# Patient Record
Sex: FEMALE | Race: WHITE | NOT HISPANIC OR LATINO | URBAN - METROPOLITAN AREA
[De-identification: names, ages, dates, MRNs, and addresses within clinical notes are randomized per-mention and may not be internally consistent; named-entity substitution may affect disease eponyms.]

---

## 2020-02-12 ENCOUNTER — OFFICE VISIT (OUTPATIENT)
Dept: OBGYN CLINIC | Facility: CLINIC | Age: 25
End: 2020-02-12
Payer: COMMERCIAL

## 2020-02-12 VITALS
BODY MASS INDEX: 29.86 KG/M2 | SYSTOLIC BLOOD PRESSURE: 122 MMHG | DIASTOLIC BLOOD PRESSURE: 78 MMHG | WEIGHT: 197 LBS | HEIGHT: 68 IN

## 2020-02-12 DIAGNOSIS — E58 DIETARY CALCIUM DEFICIENCY: ICD-10-CM

## 2020-02-12 DIAGNOSIS — Z20.2 POSSIBLE EXPOSURE TO STD: ICD-10-CM

## 2020-02-12 DIAGNOSIS — Z12.4 PAP SMEAR FOR CERVICAL CANCER SCREENING: ICD-10-CM

## 2020-02-12 DIAGNOSIS — Z01.419 ENCOUNTER FOR ANNUAL ROUTINE GYNECOLOGICAL EXAMINATION: Primary | ICD-10-CM

## 2020-02-12 DIAGNOSIS — Z30.41 ORAL CONTRACEPTIVE PILL SURVEILLANCE: ICD-10-CM

## 2020-02-12 DIAGNOSIS — N94.6 DYSMENORRHEA: ICD-10-CM

## 2020-02-12 DIAGNOSIS — Z72.3 INADEQUATE EXERCISE: ICD-10-CM

## 2020-02-12 DIAGNOSIS — Z23 NEED FOR HPV VACCINATION: ICD-10-CM

## 2020-02-12 PROCEDURE — 90471 IMMUNIZATION ADMIN: CPT | Performed by: OBSTETRICS & GYNECOLOGY

## 2020-02-12 PROCEDURE — G0145 SCR C/V CYTO,THINLAYER,RESCR: HCPCS | Performed by: OBSTETRICS & GYNECOLOGY

## 2020-02-12 PROCEDURE — 90651 9VHPV VACCINE 2/3 DOSE IM: CPT | Performed by: OBSTETRICS & GYNECOLOGY

## 2020-02-12 PROCEDURE — 87591 N.GONORRHOEAE DNA AMP PROB: CPT | Performed by: OBSTETRICS & GYNECOLOGY

## 2020-02-12 PROCEDURE — 87491 CHLMYD TRACH DNA AMP PROBE: CPT | Performed by: OBSTETRICS & GYNECOLOGY

## 2020-02-12 PROCEDURE — S0610 ANNUAL GYNECOLOGICAL EXAMINA: HCPCS | Performed by: OBSTETRICS & GYNECOLOGY

## 2020-02-12 RX ORDER — DROSPIRENONE AND ETHINYL ESTRADIOL 0.02-3(28)
1 KIT ORAL DAILY
Qty: 28 TABLET | Refills: 3 | Status: SHIPPED | OUTPATIENT
Start: 2020-02-12 | End: 2020-07-30 | Stop reason: ALTCHOICE

## 2020-02-12 NOTE — PROGRESS NOTES
Pt is a 30 y o  No obstetric history on file  with Patient's last menstrual period was 01/18/2020  using condoms (male) for [facility] presents for preventive care  She notes the same partner since her last STI evaluation  In her lifetime she has been involved with 1 partner   Safe sexual practices (monogomy, condoms) are followed consistently  · She does  feel safe in the relationship  She does feel safe in her home  · Her calcium intake encompasses cheese and yogurt for a total of 1-2 servings daily on average  She does not take additional Vitamin D (MVI or supplement)  · She exercises 0 times per week  · Her menses occur every 28 Days, last 4-5 days and require regular pad every 2-6 hours  Menstrual History:  OB History    None      Obstetric Comments   Menarche: 10  LMP: 1/18/2020, every 28 days, 5 days, first 2 days one pad every 2-3 hrs, one pad every 5-6 hours the other days            Menarche age: 13  Patient's last menstrual period was 01/18/2020  ·      · She has never recieved an HPV vaccine and does desire to begin or continue the HPV vaccination series  · tobacco use : does not use tobacco              · Colonoscopy: not indicated  · Pap: never had, collected today  · Mammogram: not indicated  · Agrees to ch/bisi screening  · Pt desires to discuss contraceptive options--reviewed ocps, orthoevra, nuva ring, LN-IUD, depo provera, nexplanon, Paragard  Pt mos interested in OCPS as she has used them in the past  Reviewed risks of btb, nausea, breast tenderness, dvt/pe/mi and she desires to proceed  She reports cramping with her menses that she would like to use ocps to help decrease       Past Medical History:   Diagnosis Date    Lyme disease     as a child     Need for HPV vaccination     Varicella vaccination        Past Surgical History:   Procedure Laterality Date    NO PAST SURGERIES         OB History   Obstetric Comments   Menarche: 10   LMP: 1/18/2020, every 28 days, 5 days, first 2 days one pad every 2-3 hrs, one pad every 5-6 hours the other days              Current Outpatient Medications:     drospirenone-ethinyl estradiol (WILIAN) 3-0 02 MG per tablet, Take 1 tablet by mouth daily, Disp: 28 tablet, Rfl: 3    No Known Allergies    Social History     Socioeconomic History    Marital status: Single     Spouse name: None    Number of children: 0    Years of education: None    Highest education level: Bachelor's degree (e g , BA, AB, BS)   Occupational History    Occupation: teacher     Comment: reading, pa   Social Needs    Financial resource strain: None    Food insecurity:     Worry: None     Inability: None    Transportation needs:     Medical: None     Non-medical: None   Tobacco Use    Smoking status: Never Smoker    Smokeless tobacco: Never Used   Substance and Sexual Activity    Alcohol use:  Yes     Alcohol/week: 12 0 standard drinks     Types: 6 Glasses of wine, 6 Cans of beer per week     Frequency: 2-3 times a week     Drinks per session: 3 or 4    Drug use: Never    Sexual activity: Yes     Partners: Male     Birth control/protection: Condom Male     Comment: lifetime partners: 1   Lifestyle    Physical activity:     Days per week: None     Minutes per session: None    Stress: None   Relationships    Social connections:     Talks on phone: None     Gets together: None     Attends Restorationist service: None     Active member of club or organization: None     Attends meetings of clubs or organizations: None     Relationship status: None    Intimate partner violence:     Fear of current or ex partner: None     Emotionally abused: None     Physically abused: None     Forced sexual activity: None   Other Topics Concern    None   Social History Narrative    Taoism preferences: none    Accepts blood products        Exercise: none     Calcium: 1 yogurt a week, 1-2 servings of cheese daily        Family History   Problem Relation Age of Onset    Hashimoto's thyroiditis Mother     No Known Problems Father     Diabetes Maternal Grandfather     Diabetes type I Sister     No Known Problems Brother     No Known Problems Maternal Grandmother     No Known Problems Sister     No Known Problems Sister     No Known Problems Sister     No Known Problems Brother     No Known Problems Brother     No Known Problems Brother     Breast cancer Neg Hx     Ovarian cancer Neg Hx     Colon cancer Neg Hx        Blood pressure 122/78, height 5' 8" (1 727 m), weight 89 4 kg (197 lb), last menstrual period 01/18/2020  and Body mass index is 29 95 kg/m²  Physical Exam   Constitutional: She is oriented to person, place, and time  She appears well-developed and well-nourished  HENT:   Head: Normocephalic and atraumatic  Eyes: Conjunctivae and EOM are normal    Neck: Normal range of motion  Neck supple  No tracheal deviation present  No thyromegaly present  Cardiovascular: Normal rate, regular rhythm and normal heart sounds  Pulmonary/Chest: Effort normal and breath sounds normal  No stridor  No respiratory distress  She has no wheezes  She has no rales  Abdominal: Soft  Bowel sounds are normal  She exhibits no distension and no mass  There is no tenderness  There is no rebound and no guarding  Musculoskeletal: Normal range of motion  She exhibits no edema or tenderness  Lymphadenopathy:     She has no cervical adenopathy  Neurological: She is alert and oriented to person, place, and time  Skin: Skin is warm  No rash noted  No erythema  Psychiatric: She has a normal mood and affect  Her behavior is normal  Judgment and thought content normal        Breasts: breasts appear normal, no suspicious masses, no skin or nipple changes or axillary nodes, symmetric fibrous changes in both upper outer quadrants      vulva: normal external genitalia for age and no lesions, masses, epithelial changes, or exudate  vagina: color pink and rugae  well formed rugae  cervix: nullip, no lesions  and pap obtained  uterus: NSSC, AF, NT, mobile  adnexa: no masses or tenderness      A/P:  Pt is a 30 y o  No obstetric history on file  with      Diagnoses and all orders for this visit:    Encounter for annual routine gynecological examination    Pap smear for cervical cancer screening  -     Liquid-based pap, screening    Need for HPV vaccination  -     HPV VACCINE 9 VALENT IM    Possible exposure to STD  -     Chlamydia/GC amplified DNA by PCR    Dysmenorrhea  -     drospirenone-ethinyl estradiol (WILIAN) 3-0 02 MG per tablet; Take 1 tablet by mouth daily    Oral contraceptive pill surveillance  -     drospirenone-ethinyl estradiol (WILIAN) 3-0 02 MG per tablet; Take 1 tablet by mouth daily    Dietary calcium deficiency    Inadequate exercise    BMI 29 0-29 9,adult    Patient advised recommendation of daily dietary calcium of  1000 mg calcium  Patient advised recommendation of exercise 5 times per week for 30 minutes  Patient advised recommendation of BMI to be between 19-25

## 2020-02-13 LAB
C TRACH DNA SPEC QL NAA+PROBE: NEGATIVE
N GONORRHOEA DNA SPEC QL NAA+PROBE: NEGATIVE

## 2020-02-17 LAB
LAB AP GYN PRIMARY INTERPRETATION: NORMAL
Lab: NORMAL

## 2020-04-09 ENCOUNTER — CLINICAL SUPPORT (OUTPATIENT)
Dept: OBGYN CLINIC | Facility: CLINIC | Age: 25
End: 2020-04-09
Payer: COMMERCIAL

## 2020-04-09 DIAGNOSIS — Z23 NEED FOR HPV VACCINATION: Primary | ICD-10-CM

## 2020-04-09 PROCEDURE — 90471 IMMUNIZATION ADMIN: CPT | Performed by: OBSTETRICS & GYNECOLOGY

## 2020-04-09 PROCEDURE — 90651 9VHPV VACCINE 2/3 DOSE IM: CPT | Performed by: OBSTETRICS & GYNECOLOGY

## 2020-04-12 DIAGNOSIS — N94.6 DYSMENORRHEA: ICD-10-CM

## 2020-04-12 DIAGNOSIS — Z30.41 ORAL CONTRACEPTIVE PILL SURVEILLANCE: ICD-10-CM

## 2020-06-15 ENCOUNTER — CLINICAL SUPPORT (OUTPATIENT)
Dept: OBGYN CLINIC | Facility: CLINIC | Age: 25
End: 2020-06-15
Payer: COMMERCIAL

## 2020-06-15 VITALS — BODY MASS INDEX: 27.67 KG/M2 | TEMPERATURE: 97.5 F | WEIGHT: 182 LBS

## 2020-06-15 DIAGNOSIS — Z23 NEED FOR VACCINATION AGAINST HUMAN PAPILLOMAVIRUS: Primary | ICD-10-CM

## 2020-06-15 PROCEDURE — 90471 IMMUNIZATION ADMIN: CPT | Performed by: OBSTETRICS & GYNECOLOGY

## 2020-06-15 PROCEDURE — 90651 9VHPV VACCINE 2/3 DOSE IM: CPT | Performed by: OBSTETRICS & GYNECOLOGY

## 2020-07-10 ENCOUNTER — TELEPHONE (OUTPATIENT)
Dept: UROLOGY | Facility: AMBULATORY SURGERY CENTER | Age: 25
End: 2020-07-10

## 2020-07-10 NOTE — TELEPHONE ENCOUNTER
New patient complaining of recurrent urinary symptoms  [facility] and put [facility]  She finished them but still feels the same  Would like a sooner appointment

## 2020-07-13 ENCOUNTER — TELEPHONE (OUTPATIENT)
Dept: UROLOGY | Facility: MEDICAL CENTER | Age: 25
End: 2020-07-13

## 2020-07-13 NOTE — TELEPHONE ENCOUNTER
Call placed to patient  This is a new patient who is going to be seeing Dr Thom Joshi at the end of the month  Patient states she had gone to an urgent care and got urinary testing done and was treated for an infection  Patient finished antibiotics last Wednesday and is still having a lot of pelvic discomfort,urinary urgency  She is currently using AZO which she states is helping but not fully  Please advise on what we should have this patient do next if we are able to order anything for her wether it be urine testing or any medication to start

## 2020-07-13 NOTE — TELEPHONE ENCOUNTER
Call placed to patient  This is a new patient who is going to be seeing Dr Monie Deigo at the end of the month  Patient states she had gone to an urgent care and got urinary testing done and was treated for an infection  Patient finished antibiotics last Wednesday and is still having a lot of pelvic discomfort,urinary urgency  She is currently using AZO which she states is helping but not fully  Please advise on what we should have this patient do next if we are able to order anything for her wether it be urine testing or any medication to start

## 2020-07-13 NOTE — TELEPHONE ENCOUNTER
Because patient has never been evaluated by our practice, will need to establish care before placing orders  Until appointment, patient should continue increasing water intake, avoid irritants, and take AZO as needed  She can also contact her PCP

## 2020-07-13 NOTE — TELEPHONE ENCOUNTER
Call placed to patient and left a detailed message on answering machine as per signed communication consent form  Informed patient in the message of instructions of DANIEL at this time  Office number was provided for the patient to call back with any questions or concerns she should have

## 2020-07-30 ENCOUNTER — OFFICE VISIT (OUTPATIENT)
Dept: UROLOGY | Facility: MEDICAL CENTER | Age: 25
End: 2020-07-30
Payer: COMMERCIAL

## 2020-07-30 VITALS
DIASTOLIC BLOOD PRESSURE: 78 MMHG | WEIGHT: 178 LBS | BODY MASS INDEX: 26.36 KG/M2 | HEIGHT: 69 IN | SYSTOLIC BLOOD PRESSURE: 120 MMHG

## 2020-07-30 DIAGNOSIS — N39.0 RECURRENT UTI: Primary | ICD-10-CM

## 2020-07-30 DIAGNOSIS — R10.2 PELVIC PAIN: ICD-10-CM

## 2020-07-30 LAB
SL AMB  POCT GLUCOSE, UA: NORMAL
SL AMB LEUKOCYTE ESTERASE,UA: NORMAL
SL AMB POCT BILIRUBIN,UA: NORMAL
SL AMB POCT BLOOD,UA: NORMAL
SL AMB POCT CLARITY,UA: CLEAR
SL AMB POCT COLOR,UA: NORMAL
SL AMB POCT KETONES,UA: NORMAL
SL AMB POCT NITRITE,UA: NORMAL
SL AMB POCT PH,UA: 5.5
SL AMB POCT SPECIFIC GRAVITY,UA: 1.02
SL AMB POCT URINE PROTEIN: NORMAL
SL AMB POCT UROBILINOGEN: 0.2

## 2020-07-30 PROCEDURE — 3008F BODY MASS INDEX DOCD: CPT | Performed by: OBSTETRICS & GYNECOLOGY

## 2020-07-30 PROCEDURE — 81003 URINALYSIS AUTO W/O SCOPE: CPT | Performed by: UROLOGY

## 2020-07-30 PROCEDURE — 99244 OFF/OP CNSLTJ NEW/EST MOD 40: CPT | Performed by: UROLOGY

## 2020-07-30 PROCEDURE — 87086 URINE CULTURE/COLONY COUNT: CPT | Performed by: UROLOGY

## 2020-07-30 RX ORDER — TOLTERODINE 4 MG/1
4 CAPSULE, EXTENDED RELEASE ORAL DAILY
Qty: 30 CAPSULE | Refills: 1 | Status: SHIPPED | OUTPATIENT
Start: 2020-07-30 | End: 2020-08-24

## 2020-07-30 NOTE — PATIENT INSTRUCTIONS
[facility]    Consider cystoscopy and hydro distension in the outpatient operating room if symptoms do not get better with dietary manipulation and gynecology evaluation  Call within 2-3 weeks if you think we need to schedule this      Urine culture reports for past 12 months

## 2020-07-30 NOTE — PROGRESS NOTES
HISTORY:     two years of worsening symptoms of pelvic pain and pressure, dysuria, urgency and frequency  Has been given antibiotics several times for possible UTI, [facility]   She says cultures have sometime showed bacteria, other times normal       No hematuria or fevers chills  Occasional bilateral CVA pain when this occurs  Symptoms are quite bad the  past 4-6 weeks, has not been on antibiotics recently  ASSESSMENT / PLAN:      Urine is clear today, will culture just to be sure  We discussed that this could be recurrent UTI, or possibly interstitial cystitis  I recommend elimination diet based on IC recommendations  Order on the computer for culture p r n  Through [facility] lab  She will get culture reports for me  She has gynecology appointment next week  I discussed possibly cystoscopy hydro distension in the outpatient OR as both diagnostic and therapeutic  She will think about this, and if she was to proceed with this in the next few weeks, she will call us and we will discuss scheduling  The following portions of the patient's history were reviewed and updated as appropriate: allergies, current medications, past family history, past medical history, past social history, past surgical history and problem list     Review of Systems   All other systems reviewed and are negative  Objective:     Physical Exam   Constitutional: She is oriented to person, place, and time  She appears well-developed and well-nourished  No distress  HENT:   Head: Normocephalic and atraumatic  Eyes: No scleral icterus  Pulmonary/Chest: Effort normal    Abdominal:     Mild suprapubic tenderness   Neurological: She is alert and oriented to person, place, and time  Skin: She is not diaphoretic  No pallor  Psychiatric: She has a normal mood and affect   Her behavior is normal  Judgment and thought content normal          No results found for: PSA]  No results found for: BUN  No results found for: CREATININE  No components found for: CBC      Patient Active Problem List   Diagnosis    Encounter for annual routine gynecological examination    Pelvic pain    Recurrent UTI        Diagnoses and all orders for this visit:    Recurrent UTI  -     POCT urine dip auto non-scope  -     Urine culture; Future  -     Urine culture    Pelvic pain  -     tolterodine (DETROL LA) 4 mg 24 hr capsule; Take 1 capsule (4 mg total) by mouth daily           Patient ID: Niki Albarran is a 30 y o  female        Current Outpatient Medications:     tolterodine (DETROL LA) 4 mg 24 hr capsule, Take 1 capsule (4 mg total) by mouth daily, Disp: 30 capsule, Rfl: 1    Past Medical History:   Diagnosis Date    Lyme disease     as a child     Need for HPV vaccination     Varicella vaccination        Past Surgical History:   Procedure Laterality Date    NO PAST SURGERIES         Social History

## 2020-07-31 LAB — BACTERIA UR CULT: NORMAL

## 2020-08-04 ENCOUNTER — OFFICE VISIT (OUTPATIENT)
Dept: OBGYN CLINIC | Facility: CLINIC | Age: 25
End: 2020-08-04
Payer: COMMERCIAL

## 2020-08-04 VITALS
BODY MASS INDEX: 25.02 KG/M2 | HEART RATE: 93 BPM | WEIGHT: 167 LBS | SYSTOLIC BLOOD PRESSURE: 120 MMHG | DIASTOLIC BLOOD PRESSURE: 70 MMHG | TEMPERATURE: 97.6 F

## 2020-08-04 DIAGNOSIS — N92.0 MENORRHAGIA WITH REGULAR CYCLE: Primary | ICD-10-CM

## 2020-08-04 DIAGNOSIS — N94.6 DYSMENORRHEA: ICD-10-CM

## 2020-08-04 PROCEDURE — 1036F TOBACCO NON-USER: CPT | Performed by: OBSTETRICS & GYNECOLOGY

## 2020-08-04 PROCEDURE — 99214 OFFICE O/P EST MOD 30 MIN: CPT | Performed by: OBSTETRICS & GYNECOLOGY

## 2020-08-04 RX ORDER — TRANEXAMIC ACID 650 1/1
2 TABLET ORAL EVERY 8 HOURS PRN
Qty: 30 TABLET | Refills: 2 | Status: SHIPPED | OUTPATIENT
Start: 2020-08-04 | End: 2020-10-29 | Stop reason: SDUPTHER

## 2020-08-04 NOTE — PROGRESS NOTES
Patient is a 30 y o  No obstetric history on file  with Patient's last menstrual period was 08/03/2020 (exact date)  who presents requesting evaluation of heavy and painful periods  Pt reports that she was started on OCPS in February for contraception and menorrhagia and dysmenorrhea  Pt reports she stopped taking them because she did not like her mood on them  She report she still had painful menses, but they were lighter while using them  She reports that her periods have gotten steadily worse since November  She reports she feels like something is ripping apart and then she will pass tissue or clots thereafter  She reports that the clots are teaspoon size  She also reports that her menses are very heavy and she feels dizzy and lightheaded while changing position during her menses  She reports that she saturates a regular maxipad every 2 hours at it's heaviest and that usually lasts for 2 days  Her menses are every 28 days lasting 5 days    She reports that it is hard to focus at times as well  She reports that she tried to donate blood once during her menses and she was told she was anemic and was not allowed to donate  She reports she gets bloated and has decreased appetite during her menses  She reports she has tried tylenol with occasional improvement  She has not tried ibuprofen  She reports she has been dieting and has lost 30 lbs since February and isn't sure if this is why things are getting progressively worse  Advised pt will check CBC, TSH and PRL  Also will check pelvic u/s to r/o pathology  Reviewed options for management of menorrhagia including ocps, ortho evra, nuva ring, Mirena and lysteda  Pt reports she is most interested in lysteda  Reviewed proper use and that most common side effect is a headache  Pt will start today and f/u in 3 months        Past Medical History:   Diagnosis Date    Lyme disease     as a child     Need for HPV vaccination     Varicella vaccination Past Surgical History:   Procedure Laterality Date    NO PAST SURGERIES         OB History   Obstetric Comments   Menarche: 10   LMP: 1/18/2020, every 28 days, 5 days, first 2 days one pad every 2-3 hrs, one pad every 5-6 hours the other days           Current Outpatient Medications:     tolterodine (DETROL LA) 4 mg 24 hr capsule, Take 1 capsule (4 mg total) by mouth daily, Disp: 30 capsule, Rfl: 1    Tranexamic Acid 650 MG TABS, Take 2 tablets by mouth every 8 (eight) hours as needed (for heavy bleeding up to 5 days of each menstrual cycle), Disp: 30 tablet, Rfl: 2    No Known Allergies    Social History     Socioeconomic History    Marital status: Single     Spouse name: None    Number of children: 0    Years of education: None    Highest education level: Bachelor's degree (e g , BA, AB, BS)   Occupational History    Occupation: teacher     Comment: reading, pa   Social Needs    Financial resource strain: None    Food insecurity     Worry: None     Inability: None    Transportation needs     Medical: None     Non-medical: None   Tobacco Use    Smoking status: Never Smoker    Smokeless tobacco: Never Used   Substance and Sexual Activity    Alcohol use:  Yes     Alcohol/week: 12 0 standard drinks     Types: 6 Glasses of wine, 6 Cans of beer per week     Frequency: 2-3 times a week     Drinks per session: 3 or 4    Drug use: Never    Sexual activity: Yes     Partners: Male     Birth control/protection: Condom Male     Comment: lifetime partners: 1   Lifestyle    Physical activity     Days per week: None     Minutes per session: None    Stress: None   Relationships    Social connections     Talks on phone: None     Gets together: None     Attends Christianity service: None     Active member of club or organization: None     Attends meetings of clubs or organizations: None     Relationship status: None    Intimate partner violence     Fear of current or ex partner: None     Emotionally abused: None     Physically abused: None     Forced sexual activity: None   Other Topics Concern    None   Social History Narrative    Holiness preferences: none    Accepts blood products        Exercise: none     Calcium: 1 yogurt a week, 1-2 servings of cheese daily        Family History   Problem Relation Age of Onset    Hashimoto's thyroiditis Mother     No Known Problems Father     Diabetes Maternal Grandfather     Diabetes type I Sister     No Known Problems Brother     No Known Problems Maternal Grandmother     No Known Problems Sister     No Known Problems Sister     No Known Problems Sister     No Known Problems Brother     No Known Problems Brother     No Known Problems Brother     Breast cancer Neg Hx     Ovarian cancer Neg Hx     Colon cancer Neg Hx        Review of Systems   Constitutional: Negative for chills, fatigue, fever and unexpected weight change  HENT: Negative for congestion, mouth sores and sore throat  Respiratory: Negative for cough, chest tightness, shortness of breath and wheezing  Cardiovascular: Negative for chest pain and palpitations  Gastrointestinal: Negative for abdominal distention, abdominal pain, constipation, diarrhea, nausea and vomiting  Endocrine: Negative for cold intolerance and heat intolerance  Genitourinary: Positive for menstrual problem (see hpi)  Negative for dyspareunia, dysuria, genital sores, pelvic pain, vaginal bleeding, vaginal discharge and vaginal pain  Pt also currently seeing a urologist for UTI like symptoms without UTI--presumed IC per patient, just starting treatment now  Musculoskeletal: Negative for arthralgias  Skin: Negative for color change and rash  Neurological: Negative for dizziness, light-headedness and headaches  Hematological: Negative for adenopathy  Blood pressure 120/70, pulse 93, temperature 97 6 °F (36 4 °C), weight 75 8 kg (167 lb), last menstrual period 08/03/2020     and Body mass index is 25 02 kg/m²  Physical Exam  Constitutional:       General: She is not in acute distress  Appearance: Normal appearance  She is not ill-appearing or diaphoretic  HENT:      Head: Normocephalic and atraumatic  Eyes:      Extraocular Movements: Extraocular movements intact  Conjunctiva/sclera: Conjunctivae normal    Neck:      Musculoskeletal: Normal range of motion  Cardiovascular:      Rate and Rhythm: Normal rate and regular rhythm  Pulmonary:      Effort: Pulmonary effort is normal  No respiratory distress  Breath sounds: Normal breath sounds  No stridor  No wheezing or rhonchi  Abdominal:      General: Abdomen is flat  Bowel sounds are normal  There is no distension  Palpations: Abdomen is soft  There is no mass  Tenderness: There is no abdominal tenderness  There is no guarding or rebound  Hernia: No hernia is present  Musculoskeletal: Normal range of motion  General: No swelling or tenderness  Skin:     General: Skin is warm  Coloration: Skin is not pale  Findings: No erythema or rash  Neurological:      Mental Status: She is alert and oriented to person, place, and time  Psychiatric:         Mood and Affect: Mood normal          Behavior: Behavior normal          Thought Content: Thought content normal          Judgment: Judgment normal           vulva: normal external genitalia for age and no lesions, masses, epithelial changes, or exudate  vagina: color pink, rugae  well formed rugae and bleeding  with a moderate amount of bleeding  cervix: nullip and no lesions   uterus: NSSC, AF, NT, mobile  adnexa: no masses or tenderness      A/P:  Pt is a 30 y o  No obstetric history on file  with      Diagnoses and all orders for this visit:    Menorrhagia with regular cycle  -     CBC; Future  -     Prolactin; Future  -     TSH, 3rd generation with Free T4 reflex;  Future  -     US pelvis complete w transvaginal; Future  -     Tranexamic Acid 650 MG TABS; Take 2 tablets by mouth every 8 (eight) hours as needed (for heavy bleeding up to 5 days of each menstrual cycle)    Dysmenorrhea  -     US pelvis complete w transvaginal; Future      F/U 3 months

## 2020-08-21 DIAGNOSIS — R10.2 PELVIC PAIN: ICD-10-CM

## 2020-08-24 RX ORDER — TOLTERODINE 4 MG/1
CAPSULE, EXTENDED RELEASE ORAL
Qty: 30 CAPSULE | Refills: 1 | Status: SHIPPED | OUTPATIENT
Start: 2020-08-24 | End: 2020-09-08

## 2020-09-07 DIAGNOSIS — R10.2 PELVIC PAIN: ICD-10-CM

## 2020-09-08 RX ORDER — TOLTERODINE 4 MG/1
CAPSULE, EXTENDED RELEASE ORAL
Qty: 90 CAPSULE | Refills: 1 | Status: SHIPPED | OUTPATIENT
Start: 2020-09-08 | End: 2020-09-09 | Stop reason: SINTOL

## 2020-09-09 ENCOUNTER — PATIENT MESSAGE (OUTPATIENT)
Dept: UROLOGY | Facility: MEDICAL CENTER | Age: 25
End: 2020-09-09

## 2020-09-14 ENCOUNTER — APPOINTMENT (OUTPATIENT)
Dept: LAB | Age: 25
End: 2020-09-14
Payer: COMMERCIAL

## 2020-09-14 ENCOUNTER — OFFICE VISIT (OUTPATIENT)
Dept: INTERNAL MEDICINE CLINIC | Facility: CLINIC | Age: 25
End: 2020-09-14
Payer: COMMERCIAL

## 2020-09-14 VITALS
DIASTOLIC BLOOD PRESSURE: 82 MMHG | TEMPERATURE: 97.8 F | BODY MASS INDEX: 23.58 KG/M2 | RESPIRATION RATE: 16 BRPM | OXYGEN SATURATION: 97 % | HEIGHT: 69 IN | HEART RATE: 66 BPM | SYSTOLIC BLOOD PRESSURE: 122 MMHG | WEIGHT: 159.2 LBS

## 2020-09-14 DIAGNOSIS — N92.0 MENORRHAGIA WITH REGULAR CYCLE: ICD-10-CM

## 2020-09-14 DIAGNOSIS — D22.9 NEVUS: ICD-10-CM

## 2020-09-14 DIAGNOSIS — D22.9 NEVUS SEBACEOUS: ICD-10-CM

## 2020-09-14 DIAGNOSIS — Z23 NEED FOR VACCINATION: ICD-10-CM

## 2020-09-14 DIAGNOSIS — F41.1 GAD (GENERALIZED ANXIETY DISORDER): Primary | ICD-10-CM

## 2020-09-14 DIAGNOSIS — F33.1 MODERATE EPISODE OF RECURRENT MAJOR DEPRESSIVE DISORDER (HCC): ICD-10-CM

## 2020-09-14 DIAGNOSIS — N39.0 RECURRENT UTI: ICD-10-CM

## 2020-09-14 LAB
ERYTHROCYTE [DISTWIDTH] IN BLOOD BY AUTOMATED COUNT: 13.1 % (ref 11.6–15.1)
HCT VFR BLD AUTO: 38.8 % (ref 34.8–46.1)
HGB BLD-MCNC: 12.8 G/DL (ref 11.5–15.4)
MCH RBC QN AUTO: 30.9 PG (ref 26.8–34.3)
MCHC RBC AUTO-ENTMCNC: 33 G/DL (ref 31.4–37.4)
MCV RBC AUTO: 94 FL (ref 82–98)
PLATELET # BLD AUTO: 233 THOUSANDS/UL (ref 149–390)
PMV BLD AUTO: 10.4 FL (ref 8.9–12.7)
RBC # BLD AUTO: 4.14 MILLION/UL (ref 3.81–5.12)
WBC # BLD AUTO: 7.06 THOUSAND/UL (ref 4.31–10.16)

## 2020-09-14 PROCEDURE — 90472 IMMUNIZATION ADMIN EACH ADD: CPT

## 2020-09-14 PROCEDURE — 84443 ASSAY THYROID STIM HORMONE: CPT

## 2020-09-14 PROCEDURE — 90471 IMMUNIZATION ADMIN: CPT

## 2020-09-14 PROCEDURE — 99204 OFFICE O/P NEW MOD 45 MIN: CPT | Performed by: INTERNAL MEDICINE

## 2020-09-14 PROCEDURE — 87086 URINE CULTURE/COLONY COUNT: CPT

## 2020-09-14 PROCEDURE — 36415 COLL VENOUS BLD VENIPUNCTURE: CPT

## 2020-09-14 PROCEDURE — 90715 TDAP VACCINE 7 YRS/> IM: CPT

## 2020-09-14 PROCEDURE — 84146 ASSAY OF PROLACTIN: CPT

## 2020-09-14 PROCEDURE — 85027 COMPLETE CBC AUTOMATED: CPT

## 2020-09-14 PROCEDURE — 90686 IIV4 VACC NO PRSV 0.5 ML IM: CPT

## 2020-09-14 RX ORDER — ESCITALOPRAM OXALATE 5 MG/1
5 TABLET ORAL DAILY
Qty: 30 TABLET | Refills: 2 | Status: SHIPPED | OUTPATIENT
Start: 2020-09-14 | End: 2020-11-09 | Stop reason: SDUPTHER

## 2020-09-14 NOTE — PROGRESS NOTES
Assessment/Plan:    GISEL (generalized anxiety disorder)  Start Lexapro 5mg a day  Continue seeing therapist    Obtain labs ordered by Gyn     Problem List Items Addressed This Visit        Other    GISEL (generalized anxiety disorder) - Primary     Start Lexapro 5mg a day  Continue seeing therapist         Relevant Medications    escitalopram (LEXAPRO) 5 mg tablet    Moderate episode of recurrent major depressive disorder ([facility] [address] )    Relevant Medications    escitalopram (LEXAPRO) 5 mg tablet      Other Visit Diagnoses     Need for vaccination        Relevant Orders    Tdap vaccine greater than or equal to 8yo IM (Completed)    influenza vaccine, quadrivalent, 0 5 mL, preservative-free, for adult and pediatric patients 6 mos+ (AFLURIA, FLUARIX, FLULAVAL, FLUZONE) (Completed)    Nevus        Relevant Orders    Ambulatory referral to Dermatology    Ambulatory referral to Dermatology    Nevus sebaceous        Relevant Orders    Ambulatory referral to Dermatology    Ambulatory referral to Dermatology            Subjective:      Patient ID: Cr John is a 30 y o  female  HPI  Here to establish care  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, on Lysteda PRN  Blood work recently ordered by them  Frequent UTI symptoms and saw urology, diagnosed with interstitial cystitis after failure to improve on multiple rounds of abx  Better with dietary changes and drinking baking soda  Moles on her back and leg she would like checked out  Anxiety and depression   She started seeing a therapist in July, +SI, self harm  Denies suicidal plans  Symptoms worsened in July but have been going on for 2-3years  Frequent panic attacks, interrupting work  +weight loss 40lbs in the past 6 months  She started exercising regularly and changed her diet  She is no longer restricting her diet  Her weight is stable now around 157lbs    The following portions of the patient's history were reviewed and updated as appropriate: allergies, current medications, past family history, past medical history, past social history, past surgical history and problem list     Review of Systems   Constitutional: Negative for chills, fatigue, fever and unexpected weight change  HENT: Negative for congestion, sinus pressure and sore throat  Respiratory: Negative for cough, shortness of breath and wheezing  Cardiovascular: Negative for chest pain, palpitations and leg swelling  Gastrointestinal: Negative for abdominal pain, constipation, diarrhea, nausea and vomiting  Genitourinary: Positive for menstrual problem (heavy)  Musculoskeletal: Negative for arthralgias and myalgias  Skin:        Large ?mole on her back she has had for many years  Cyst on her scalp since she was a baby   Neurological: Negative for dizziness and headaches  Psychiatric/Behavioral:        See hpi         Objective:      /82   Pulse 66   Temp 97 8 °F (36 6 °C) (Temporal)   Resp 16   Ht 5' 8 5" (1 74 m)   Wt 72 2 kg (159 lb 3 2 oz)   SpO2 97%   BMI 23 85 kg/m²          Physical Exam  Constitutional:       Appearance: She is well-developed  HENT:      Head: Normocephalic and atraumatic  Right Ear: External ear normal       Left Ear: External ear normal    Eyes:      Conjunctiva/sclera: Conjunctivae normal    Neck:      Musculoskeletal: Neck supple  Cardiovascular:      Rate and Rhythm: Normal rate and regular rhythm  Heart sounds: Normal heart sounds  No murmur  Pulmonary:      Effort: Pulmonary effort is normal  No respiratory distress  Breath sounds: Normal breath sounds  No wheezing or rales  Abdominal:      General: There is no distension  Palpations: Abdomen is soft  There is no mass  Tenderness: There is no abdominal tenderness  There is no guarding or rebound  Musculoskeletal:      Right lower leg: No edema  Left lower leg: No edema  Skin:     General: Skin is warm and dry        Comments: Pea sized round red raised mass on the center of the back  Skin colored pea sized raised mass on the left parietal scalp   Neurological:      Mental Status: She is alert and oriented to person, place, and time  Psychiatric:         Behavior: Behavior normal          Thought Content:  Thought content normal          Judgment: Judgment normal        PHQ9-12 GISEL 7-16

## 2020-09-15 LAB
BACTERIA UR CULT: NORMAL
PROLACTIN SERPL-MCNC: 9.1 NG/ML
TSH SERPL DL<=0.05 MIU/L-ACNC: 1.12 UIU/ML (ref 0.36–3.74)

## 2020-10-20 ENCOUNTER — OFFICE VISIT (OUTPATIENT)
Dept: INTERNAL MEDICINE CLINIC | Facility: CLINIC | Age: 25
End: 2020-10-20
Payer: COMMERCIAL

## 2020-10-20 VITALS
SYSTOLIC BLOOD PRESSURE: 118 MMHG | TEMPERATURE: 97 F | HEIGHT: 69 IN | WEIGHT: 154.2 LBS | OXYGEN SATURATION: 98 % | BODY MASS INDEX: 22.84 KG/M2 | HEART RATE: 68 BPM | DIASTOLIC BLOOD PRESSURE: 64 MMHG

## 2020-10-20 DIAGNOSIS — F33.1 MODERATE EPISODE OF RECURRENT MAJOR DEPRESSIVE DISORDER (HCC): ICD-10-CM

## 2020-10-20 DIAGNOSIS — F41.1 GAD (GENERALIZED ANXIETY DISORDER): Primary | ICD-10-CM

## 2020-10-20 PROCEDURE — 99214 OFFICE O/P EST MOD 30 MIN: CPT | Performed by: INTERNAL MEDICINE

## 2020-10-20 PROCEDURE — 3725F SCREEN DEPRESSION PERFORMED: CPT | Performed by: INTERNAL MEDICINE

## 2020-10-20 PROCEDURE — 1036F TOBACCO NON-USER: CPT | Performed by: INTERNAL MEDICINE

## 2020-10-26 DIAGNOSIS — N92.0 MENORRHAGIA WITH REGULAR CYCLE: ICD-10-CM

## 2020-10-26 RX ORDER — TRANEXAMIC ACID 650 1/1
1300 TABLET ORAL EVERY 8 HOURS PRN
Qty: 30 TABLET | Refills: 0 | Status: CANCELLED | OUTPATIENT
Start: 2020-10-26

## 2020-10-27 DIAGNOSIS — N92.0 MENORRHAGIA WITH REGULAR CYCLE: ICD-10-CM

## 2020-10-27 RX ORDER — TRANEXAMIC ACID 650 1/1
1300 TABLET ORAL EVERY 8 HOURS PRN
Qty: 30 TABLET | Refills: 0 | Status: CANCELLED | OUTPATIENT
Start: 2020-10-27

## 2020-10-28 ENCOUNTER — TELEPHONE (OUTPATIENT)
Dept: OBGYN CLINIC | Facility: CLINIC | Age: 25
End: 2020-10-28

## 2020-10-28 DIAGNOSIS — N92.0 MENORRHAGIA WITH REGULAR CYCLE: ICD-10-CM

## 2020-10-29 RX ORDER — TRANEXAMIC ACID 650 1/1
1300 TABLET ORAL EVERY 8 HOURS PRN
Qty: 30 TABLET | Refills: 0 | Status: SHIPPED | OUTPATIENT
Start: 2020-10-29 | End: 2020-11-18 | Stop reason: SDUPTHER

## 2020-11-09 DIAGNOSIS — F33.1 MODERATE EPISODE OF RECURRENT MAJOR DEPRESSIVE DISORDER (HCC): ICD-10-CM

## 2020-11-09 DIAGNOSIS — F41.1 GAD (GENERALIZED ANXIETY DISORDER): ICD-10-CM

## 2020-11-09 RX ORDER — ESCITALOPRAM OXALATE 10 MG/1
10 TABLET ORAL DAILY
Qty: 90 TABLET | Refills: 1 | Status: SHIPPED | OUTPATIENT
Start: 2020-11-09 | End: 2021-01-11 | Stop reason: SDUPTHER

## 2020-11-18 ENCOUNTER — OFFICE VISIT (OUTPATIENT)
Dept: OBGYN CLINIC | Facility: CLINIC | Age: 25
End: 2020-11-18
Payer: COMMERCIAL

## 2020-11-18 VITALS
DIASTOLIC BLOOD PRESSURE: 72 MMHG | OXYGEN SATURATION: 100 % | TEMPERATURE: 98.2 F | BODY MASS INDEX: 23.16 KG/M2 | HEART RATE: 62 BPM | SYSTOLIC BLOOD PRESSURE: 110 MMHG | WEIGHT: 154.6 LBS

## 2020-11-18 DIAGNOSIS — N94.6 DYSMENORRHEA: ICD-10-CM

## 2020-11-18 DIAGNOSIS — N92.0 MENORRHAGIA WITH REGULAR CYCLE: Primary | ICD-10-CM

## 2020-11-18 PROCEDURE — 99213 OFFICE O/P EST LOW 20 MIN: CPT | Performed by: OBSTETRICS & GYNECOLOGY

## 2020-11-18 RX ORDER — TRANEXAMIC ACID 650 1/1
1300 TABLET ORAL EVERY 8 HOURS PRN
Qty: 30 TABLET | Refills: 2 | Status: SHIPPED | OUTPATIENT
Start: 2020-11-18 | End: 2021-02-19 | Stop reason: SDUPTHER

## 2020-12-17 DIAGNOSIS — F33.1 MODERATE EPISODE OF RECURRENT MAJOR DEPRESSIVE DISORDER (HCC): ICD-10-CM

## 2020-12-17 DIAGNOSIS — F41.1 GAD (GENERALIZED ANXIETY DISORDER): ICD-10-CM

## 2020-12-17 RX ORDER — ESCITALOPRAM OXALATE 10 MG/1
10 TABLET ORAL DAILY
Qty: 90 TABLET | Refills: 1 | Status: SHIPPED | OUTPATIENT
Start: 2020-12-17 | End: 2020-12-22 | Stop reason: SDUPTHER

## 2020-12-22 ENCOUNTER — OFFICE VISIT (OUTPATIENT)
Dept: INTERNAL MEDICINE CLINIC | Facility: CLINIC | Age: 25
End: 2020-12-22
Payer: COMMERCIAL

## 2020-12-22 VITALS
HEART RATE: 83 BPM | WEIGHT: 151.6 LBS | HEIGHT: 69 IN | BODY MASS INDEX: 22.45 KG/M2 | TEMPERATURE: 98.3 F | SYSTOLIC BLOOD PRESSURE: 112 MMHG | OXYGEN SATURATION: 98 % | DIASTOLIC BLOOD PRESSURE: 68 MMHG

## 2020-12-22 DIAGNOSIS — F41.1 GAD (GENERALIZED ANXIETY DISORDER): Primary | ICD-10-CM

## 2020-12-22 DIAGNOSIS — F33.1 MODERATE EPISODE OF RECURRENT MAJOR DEPRESSIVE DISORDER (HCC): ICD-10-CM

## 2020-12-22 PROCEDURE — 99214 OFFICE O/P EST MOD 30 MIN: CPT | Performed by: INTERNAL MEDICINE

## 2020-12-22 PROCEDURE — 3725F SCREEN DEPRESSION PERFORMED: CPT | Performed by: INTERNAL MEDICINE

## 2020-12-22 PROCEDURE — 3008F BODY MASS INDEX DOCD: CPT | Performed by: INTERNAL MEDICINE

## 2020-12-22 PROCEDURE — 1036F TOBACCO NON-USER: CPT | Performed by: INTERNAL MEDICINE

## 2021-01-11 DIAGNOSIS — F41.1 GAD (GENERALIZED ANXIETY DISORDER): ICD-10-CM

## 2021-01-11 DIAGNOSIS — F33.1 MODERATE EPISODE OF RECURRENT MAJOR DEPRESSIVE DISORDER (HCC): ICD-10-CM

## 2021-01-11 RX ORDER — ESCITALOPRAM OXALATE 10 MG/1
20 TABLET ORAL DAILY
Qty: 90 TABLET | Refills: 1
Start: 2021-01-11 | End: 2021-03-11 | Stop reason: SDUPTHER

## 2021-02-12 ENCOUNTER — OFFICE VISIT (OUTPATIENT)
Dept: UROLOGY | Facility: MEDICAL CENTER | Age: 26
End: 2021-02-12
Payer: COMMERCIAL

## 2021-02-12 VITALS
HEIGHT: 69 IN | WEIGHT: 150 LBS | DIASTOLIC BLOOD PRESSURE: 68 MMHG | SYSTOLIC BLOOD PRESSURE: 114 MMHG | BODY MASS INDEX: 22.22 KG/M2

## 2021-02-12 DIAGNOSIS — R10.2 PELVIC PAIN: Primary | ICD-10-CM

## 2021-02-12 DIAGNOSIS — N39.0 RECURRENT UTI: ICD-10-CM

## 2021-02-12 LAB
SL AMB  POCT GLUCOSE, UA: NORMAL
SL AMB LEUKOCYTE ESTERASE,UA: NORMAL
SL AMB POCT BILIRUBIN,UA: NORMAL
SL AMB POCT BLOOD,UA: NORMAL
SL AMB POCT CLARITY,UA: CLEAR
SL AMB POCT COLOR,UA: YELLOW
SL AMB POCT KETONES,UA: NORMAL
SL AMB POCT NITRITE,UA: NORMAL
SL AMB POCT PH,UA: 7.5
SL AMB POCT SPECIFIC GRAVITY,UA: 1.01
SL AMB POCT URINE PROTEIN: NORMAL
SL AMB POCT UROBILINOGEN: 0.2

## 2021-02-12 PROCEDURE — 99213 OFFICE O/P EST LOW 20 MIN: CPT | Performed by: UROLOGY

## 2021-02-12 PROCEDURE — 81003 URINALYSIS AUTO W/O SCOPE: CPT | Performed by: UROLOGY

## 2021-02-12 PROCEDURE — 1036F TOBACCO NON-USER: CPT | Performed by: UROLOGY

## 2021-02-12 NOTE — PROGRESS NOTES
HISTORY:    Follow-up interstitial cystitis  See prior notes regarding her symptoms at last discussion  Largely improved since that time  She has done quite well since last visit, she is very into diet control, self help groups, reading, etcetera  Diet is been a great help for her  She also takes alkaline water on occasion  ASSESSMENT / PLAN:      Doing well  We discussed options for the future  She knows hydro distension is an option for refractory symptoms  She will call if needed    The following portions of the patient's history were reviewed and updated as appropriate: allergies, current medications, past family history, past medical history, past social history, past surgical history and problem list     Review of Systems   All other systems reviewed and are negative  Objective:     Physical Exam  Constitutional:       General: She is not in acute distress  Appearance: She is well-developed  She is not diaphoretic  HENT:      Head: Normocephalic and atraumatic  Eyes:      General: No scleral icterus  Pulmonary:      Effort: Pulmonary effort is normal    Skin:     Coloration: Skin is not pale  Neurological:      Mental Status: She is alert and oriented to person, place, and time  Psychiatric:         Behavior: Behavior normal          Thought Content:  Thought content normal          Judgment: Judgment normal            No results found for: PSA]  No results found for: BUN  No results found for: CREATININE  No components found for: CBC      Patient Active Problem List   Diagnosis    Encounter for annual routine gynecological examination    Pelvic pain    Recurrent UTI    GISEL (generalized anxiety disorder)    Moderate episode of recurrent major depressive disorder ([address] )    Menorrhagia with regular cycle    Dysmenorrhea        Diagnoses and all orders for this visit:    Pelvic pain  -     POCT urine dip auto non-scope    Recurrent UTI  -     POCT urine dip auto non-scope           Patient ID: Leopoldo Gee is a 30 y o  female        Current Outpatient Medications:     escitalopram (LEXAPRO) 10 mg tablet, Take 2 tablets (20 mg total) by mouth daily, Disp: 90 tablet, Rfl: 1    Tranexamic Acid 650 MG TABS, Take 2 tablets (1,300 mg total) by mouth every 8 (eight) hours as needed (for heavy bleeding up to 5 days of each menstrual cycle), Disp: 30 tablet, Rfl: 2    Past Medical History:   Diagnosis Date    Anxiety     Depression     Lyme disease     as a child     Need for HPV vaccination     Varicella vaccination        Past Surgical History:   Procedure Laterality Date    NO PAST SURGERIES         Social History

## 2021-02-19 ENCOUNTER — ANNUAL EXAM (OUTPATIENT)
Dept: OBGYN CLINIC | Facility: CLINIC | Age: 26
End: 2021-02-19
Payer: COMMERCIAL

## 2021-02-19 VITALS
DIASTOLIC BLOOD PRESSURE: 66 MMHG | SYSTOLIC BLOOD PRESSURE: 112 MMHG | HEIGHT: 69 IN | BODY MASS INDEX: 22.51 KG/M2 | WEIGHT: 152 LBS

## 2021-02-19 DIAGNOSIS — Z01.419 ENCOUNTER FOR ANNUAL ROUTINE GYNECOLOGICAL EXAMINATION: Primary | ICD-10-CM

## 2021-02-19 DIAGNOSIS — E58 DIETARY CALCIUM DEFICIENCY: ICD-10-CM

## 2021-02-19 DIAGNOSIS — N92.0 MENORRHAGIA WITH REGULAR CYCLE: ICD-10-CM

## 2021-02-19 PROCEDURE — 3008F BODY MASS INDEX DOCD: CPT | Performed by: UROLOGY

## 2021-02-19 PROCEDURE — S0612 ANNUAL GYNECOLOGICAL EXAMINA: HCPCS | Performed by: OBSTETRICS & GYNECOLOGY

## 2021-02-19 RX ORDER — TRANEXAMIC ACID 650 1/1
1300 TABLET ORAL EVERY 8 HOURS PRN
Qty: 90 TABLET | Refills: 2 | Status: SHIPPED | OUTPATIENT
Start: 2021-02-19 | End: 2022-02-11 | Stop reason: SDUPTHER

## 2021-02-19 NOTE — PROGRESS NOTES
Pt is a 30 y o  [place] La with Patient's last menstrual period was 2021  using condoms (male) for [facility] presents for preventive care  She notes the same partner since her last STI evaluation  In her lifetime she has been involved with 1 partner   Safe sexual practices (monogomy, condoms) are followed consistently  · She does  feel safe in the relationship  She does feel safe in her home  · Her calcium intake encompasses milk (cow, goat, almond, cashew, soy, etc) for a total of 1-2 servings daily on average  She does not take additional Vitamin D (MVI or supplement)  · She exercises 3-4 times per week  · Her menses occur every 28 Days, last 4-5 days and require regular pad every 2-6 hours  Menstrual History:  OB History        0    Para   0    Term   0       0    AB   0    Living   0       SAB   0    TAB   0    Ectopic   0    Multiple   0    Live Births   0           Obstetric Comments   Menarche: 10    Menses: every 28 days, 5 days, first 2 days one reg pad every 2-3 hrs, one pad every 5-6 hours the other days            Menarche age: 13  Patient's last menstrual period was 2021  Period Duration (Days): 6 days  Period Pattern: Regular  Menstrual Flow: Moderate(moderate while taking the pill)  Menstrual Control: Maxi pad  Menstrual Control Change Freq (Hours): 1-2 a day  Dysmenorrhea: (!) Mild  Dysmenorrhea Symptoms: Cramping  ·      · She has completed the HPV vaccine series appropriate for age    · tobacco use : does not use tobacco              · Colonoscopy/mammogram: not indicated  · Pap: 2020-wnl, repeat     Past Medical History:   Diagnosis Date    Anxiety     Depression     Lyme disease     as a child     Vaccine for human papilloma virus (HPV) types 6, 11, 12, and 18 administered     Varicella vaccination        Past Surgical History:   Procedure Laterality Date    NO PAST SURGERIES         OB History    Para Term  AB Living   0 0 0 0 0 0   SAB TAB Ectopic Multiple Live Births   0 0 0 0 0   Obstetric Comments   Menarche: 10      Menses: every 28 days, 5 days, first 2 days one reg pad every 2-3 hrs, one pad every 5-6 hours the other days            Current Outpatient Medications:     escitalopram (LEXAPRO) 10 mg tablet, Take 2 tablets (20 mg total) by mouth daily, Disp: 90 tablet, Rfl: 1    Tranexamic Acid 650 MG TABS, Take 2 tablets (1,300 mg total) by mouth every 8 (eight) hours as needed (for heavy bleeding up to 5 days of each menstrual cycle), Disp: 90 tablet, Rfl: 2    No Known Allergies    Social History     Socioeconomic History    Marital status: Single     Spouse name: None    Number of children: 0    Years of education: None    Highest education level: Bachelor's degree (e g , BA, AB, BS)   Occupational History    Occupation: teacher     Comment: reading, pa   Social Needs    Financial resource strain: None    Food insecurity     Worry: None     Inability: None    Transportation needs     Medical: None     Non-medical: None   Tobacco Use    Smoking status: Never Smoker    Smokeless tobacco: Never Used   Substance and Sexual Activity    Alcohol use:  Yes     Alcohol/week: 8 0 standard drinks     Types: 4 Glasses of wine, 4 Cans of beer per week     Frequency: 2-3 times a week     Drinks per session: 1 or 2     Binge frequency: Less than monthly    Drug use: Never    Sexual activity: Yes     Partners: Male     Birth control/protection: Condom Male     Comment: lifetime partners: 1   Lifestyle    Physical activity     Days per week: None     Minutes per session: None    Stress: None   Relationships    Social connections     Talks on phone: None     Gets together: None     Attends Restorationist service: None     Active member of club or organization: None     Attends meetings of clubs or organizations: None     Relationship status: None    Intimate partner violence     Fear of current or ex partner: None     Emotionally abused: None     Physically abused: None     Forced sexual activity: None   Other Topics Concern    None   Social History Narrative    Congregational preferences: none    Accepts blood products        Exercise: 3-4 a week via yoga ~30 min    Calcium: 1 cup of almond milk  No yogurt, cheese or MV       Family History   Problem Relation Age of Onset    Hashimoto's thyroiditis Mother     Hypothyroidism Mother     No Known Problems Father     Diabetes Maternal Grandfather     Diabetes type I Sister     No Known Problems Brother     Dementia Maternal Grandmother     Dementia Paternal Grandmother     No Known Problems Paternal Grandfather     OCD Sister     No Known Problems Sister     No Known Problems Sister     No Known Problems Brother     No Known Problems Brother     No Known Problems Brother     Breast cancer Neg Hx     Ovarian cancer Neg Hx     Colon cancer Neg Hx        Blood pressure 112/66, height 5' 8 5" (1 74 m), weight 68 9 kg (152 lb), last menstrual period 01/27/2021  and Body mass index is 22 78 kg/m²  Physical Exam  Constitutional:       Appearance: Normal appearance  She is well-developed and normal weight  HENT:      Head: Normocephalic and atraumatic  Eyes:      Extraocular Movements: Extraocular movements intact  Conjunctiva/sclera: Conjunctivae normal    Neck:      Musculoskeletal: Normal range of motion and neck supple  Thyroid: No thyromegaly  Trachea: No tracheal deviation  Cardiovascular:      Rate and Rhythm: Normal rate and regular rhythm  Heart sounds: Normal heart sounds  Pulmonary:      Effort: Pulmonary effort is normal  No respiratory distress  Breath sounds: Normal breath sounds  No stridor  No wheezing or rales  Abdominal:      General: Bowel sounds are normal  There is no distension  Palpations: Abdomen is soft  There is no mass  Tenderness: There is no abdominal tenderness  There is no guarding or rebound  Musculoskeletal: Normal range of motion  General: No tenderness  Lymphadenopathy:      Cervical: No cervical adenopathy  Skin:     General: Skin is warm  Findings: No erythema or rash  Neurological:      Mental Status: She is alert and oriented to person, place, and time  Psychiatric:         Mood and Affect: Mood normal          Behavior: Behavior normal          Thought Content: Thought content normal          Judgment: Judgment normal          Breasts: breasts appear normal, no suspicious masses, no skin or nipple changes or axillary nodes, symmetric fibrous changes in both upper outer quadrants  vulva: normal external genitalia for age and no lesions, masses, epithelial changes, or exudate  vagina: color pink and rugae  well formed rugae  cervix: nullip and no lesions   uterus: NSSC, AF, NT, mobile  adnexa: no masses or tenderness      A/P:  Pt is a 30 y o  Ramon Yeny with      Ro was seen today for gynecologic exam     Diagnoses and all orders for this visit:    Encounter for annual routine gynecological examination  Pap up to date    Menorrhagia with regular cycle  -     Tranexamic Acid 650 MG TABS; Take 2 tablets (1,300 mg total) by mouth every 8 (eight) hours as needed (for heavy bleeding up to 5 days of each menstrual cycle)    Dietary calcium deficiency  Patient advised recommendation of daily dietary calcium of  1000 mg calcium

## 2021-03-11 DIAGNOSIS — F33.1 MODERATE EPISODE OF RECURRENT MAJOR DEPRESSIVE DISORDER (HCC): ICD-10-CM

## 2021-03-11 DIAGNOSIS — F41.1 GAD (GENERALIZED ANXIETY DISORDER): ICD-10-CM

## 2021-03-11 RX ORDER — ESCITALOPRAM OXALATE 10 MG/1
20 TABLET ORAL DAILY
Qty: 90 TABLET | Refills: 0
Start: 2021-03-11 | End: 2021-03-11 | Stop reason: SDUPTHER

## 2021-03-11 RX ORDER — ESCITALOPRAM OXALATE 10 MG/1
20 TABLET ORAL DAILY
Qty: 90 TABLET | Refills: 0 | Status: SHIPPED | OUTPATIENT
Start: 2021-03-11 | End: 2021-04-25

## 2021-04-09 DIAGNOSIS — Z23 ENCOUNTER FOR IMMUNIZATION: ICD-10-CM

## 2021-04-23 DIAGNOSIS — F41.1 GAD (GENERALIZED ANXIETY DISORDER): ICD-10-CM

## 2021-04-23 DIAGNOSIS — F33.1 MODERATE EPISODE OF RECURRENT MAJOR DEPRESSIVE DISORDER (HCC): ICD-10-CM

## 2021-04-25 DIAGNOSIS — F33.1 MODERATE EPISODE OF RECURRENT MAJOR DEPRESSIVE DISORDER (HCC): ICD-10-CM

## 2021-04-25 DIAGNOSIS — F41.1 GAD (GENERALIZED ANXIETY DISORDER): ICD-10-CM

## 2021-04-25 RX ORDER — ESCITALOPRAM OXALATE 20 MG/1
20 TABLET ORAL DAILY
Qty: 90 TABLET | Refills: 1 | Status: SHIPPED | OUTPATIENT
Start: 2021-04-25 | End: 2021-07-26 | Stop reason: SDUPTHER

## 2021-04-25 RX ORDER — ESCITALOPRAM OXALATE 20 MG/1
20 TABLET ORAL DAILY
Qty: 90 TABLET | Refills: 1 | Status: SHIPPED | OUTPATIENT
Start: 2021-04-25 | End: 2021-05-26 | Stop reason: SDUPTHER

## 2021-04-25 RX ORDER — ESCITALOPRAM OXALATE 10 MG/1
TABLET ORAL
Qty: 90 TABLET | Refills: 1 | Status: SHIPPED | OUTPATIENT
Start: 2021-04-25 | End: 2021-04-25

## 2021-04-30 ENCOUNTER — TELEMEDICINE (OUTPATIENT)
Dept: INTERNAL MEDICINE CLINIC | Facility: CLINIC | Age: 26
End: 2021-04-30
Payer: COMMERCIAL

## 2021-04-30 VITALS — BODY MASS INDEX: 22.48 KG/M2 | WEIGHT: 150 LBS | TEMPERATURE: 98.2 F

## 2021-04-30 DIAGNOSIS — B34.9 VIRAL INFECTION, UNSPECIFIED: Primary | ICD-10-CM

## 2021-04-30 PROCEDURE — 3725F SCREEN DEPRESSION PERFORMED: CPT | Performed by: INTERNAL MEDICINE

## 2021-04-30 PROCEDURE — 99213 OFFICE O/P EST LOW 20 MIN: CPT | Performed by: INTERNAL MEDICINE

## 2021-04-30 PROCEDURE — 1036F TOBACCO NON-USER: CPT | Performed by: INTERNAL MEDICINE

## 2021-04-30 NOTE — PROGRESS NOTES
COVID-19 Outpatient Progress Note    Assessment/Plan:    Problem List Items Addressed This Visit     None      Visit Diagnoses     Screening for HIV (human immunodeficiency virus)    -  Primary    Viral infection, unspecified        Relevant Orders    Novel Coronavirus (Covid-19),PCR [facility] - Collected at Mobile [facility] or [facility]         [id number] Plan     Encounter provider Acosta Miner MD    Provider located at [address] [place] [id number]    Recent Visits  No visits were found meeting these conditions  Showing recent visits within past 7 days and meeting all other requirements     Today's Visits  Date Type Provider Dept   04/30/21 Telemedicine Acosta Miner MD [address] [facility] today's visits and meeting all other requirements     Future Appointments  No visits were found meeting these conditions  Showing future appointments within next 150 days and meeting all other requirements      COVID-19 HPI  No results found for: PARVEZ [address], [address]  Past Medical History:   Diagnosis Date    Anxiety     Depression     Lyme disease     as a child     Vaccine for human papilloma virus (HPV) types 6, 11, 16, and 18 administered 2020    Varicella vaccination      Past Surgical History:   Procedure Laterality Date    NO PAST SURGERIES       Current Outpatient Medications   Medication Sig Dispense Refill    escitalopram (LEXAPRO) 20 mg tablet Take 1 tablet (20 mg total) by mouth daily 90 tablet 1    Tranexamic Acid 650 MG TABS Take 2 tablets (1,300 mg total) by mouth every 8 (eight) hours as needed (for heavy bleeding up to 5 days of each menstrual cycle) 90 tablet 2    escitalopram (LEXAPRO) 20 mg tablet Take 1 tablet (20 mg total) by mouth daily (Patient not taking: Reported on 4/30/2021) 90 tablet 1     No current facility-administered medications for this visit        No Known Allergies    Review of Systems  Objective:    Vitals:    04/30/21 0828   Temp: 98 2 °F (36 8 °C)   TempSrc: Oral   Weight: 68 kg (150 lb)       Physical Exam  VIRTUAL VISIT DISCLAIMER    Ro Jami Maia acknowledges that she has consented to an online visit or consultation  She understands that the online visit is based solely on information provided by her, and that, in the absence of a face-to-face physical evaluation by the physician, the diagnosis she receives is both limited and provisional in terms of accuracy and completeness  This is not intended to replace a full medical face-to-face evaluation by the physician  Ro Carvalho understands and accepts these terms

## 2021-04-30 NOTE — PROGRESS NOTES
Virtual Regular Visit      Assessment/Plan:  Suspect viral illness  Symptoms are mild   Blood tinged sputum most likely from superficial upper respiratory mucosal erosion  Discussed observation of symptoms  CXR if this conitkandy  Problem List Items Addressed This Visit     None      Visit Diagnoses     Viral infection, unspecified    -  Primary               Reason for visit is   Chief Complaint   Patient presents with    Cough    Virtual Regular Visit        Encounter provider Marquita Delarosa MD    Provider located at [address] [place] [phone]      Recent Visits  No visits were found meeting these conditions  Showing recent visits within past 7 days and meeting all other requirements     Today's Visits  Date Type Provider Dept   04/30/21 Telemedicine Marquita Delarosa MD [address] [facility] today's visits and meeting all other requirements     Future Appointments  No visits were found meeting these conditions  Showing future appointments within next 150 days and meeting all other requirements        The patient was identified by name and date of birth  Ro Clemente was informed that this is a telemedicine visit and that the visit is being conducted through [address] [place] [address] Now and patient was informed that this is a secure, HIPAA-compliant platform  She agrees to proceed     My office door was closed  No one else was in the room  She acknowledged consent and understanding of privacy and security of the video platform  The patient has agreed to participate and understands they can discontinue the visit at any time  Patient is aware this is a billable service  Subjective  Ro Clemente is a 30 y o  female with a cough         Started with a scratchy throat feeling tired and a cough 2 days ago  Symptoms are mild and she is feeling better  A little SOB yesterday  This morning had blood tinged sputum  Denies fever chills night sweats  Eating and drinking well  Fully vaccinated and no known exposure to COVID  Non smoker    Cough  This is a new problem  The current episode started yesterday  The problem has been gradually worsening  The problem occurs every few minutes  The cough is productive of bloody sputum  Associated symptoms include chills, hemoptysis, postnasal drip, rhinorrhea, a sore throat and shortness of breath  Pertinent negatives include no chest pain, ear congestion, ear pain, fever, headaches, heartburn, myalgias, nasal congestion, rash, sweats, weight loss or wheezing  Nothing aggravates the symptoms  Past Medical History:   Diagnosis Date    Anxiety     Depression     Lyme disease     as a child     Vaccine for human papilloma virus (HPV) types 6, 11, 16, and 18 administered 2020    Varicella vaccination        Past Surgical History:   Procedure Laterality Date    NO PAST SURGERIES         Current Outpatient Medications   Medication Sig Dispense Refill    escitalopram (LEXAPRO) 20 mg tablet Take 1 tablet (20 mg total) by mouth daily 90 tablet 1    Tranexamic Acid 650 MG TABS Take 2 tablets (1,300 mg total) by mouth every 8 (eight) hours as needed (for heavy bleeding up to 5 days of each menstrual cycle) 90 tablet 2    escitalopram (LEXAPRO) 20 mg tablet Take 1 tablet (20 mg total) by mouth daily (Patient not taking: Reported on 4/30/2021) 90 tablet 1     No current facility-administered medications for this visit  No Known Allergies    Review of Systems   Constitutional: Positive for chills  Negative for fever and weight loss  HENT: Positive for postnasal drip, rhinorrhea and sore throat  Negative for ear pain  Respiratory: Positive for cough, hemoptysis and shortness of breath  Negative for wheezing  Cardiovascular: Negative for chest pain  Gastrointestinal: Negative for diarrhea, heartburn, nausea and vomiting  Musculoskeletal: Negative for myalgias  Skin: Negative for rash  Neurological: Negative for headaches  Video Exam    Vitals:    04/30/21 0828   Temp: 98 2 °F (36 8 °C)   TempSrc: Oral   Weight: 68 kg (150 lb)       Physical Exam  Constitutional:       General: She is not in acute distress  Appearance: She is not ill-appearing, toxic-appearing or diaphoretic  Pulmonary:      Effort: No respiratory distress  Neurological:      Mental Status: She is oriented to person, place, and time  Psychiatric:         Mood and Affect: Mood normal          Behavior: Behavior normal          Thought Content: Thought content normal           I spent 15 minutes directly with the patient during this visit      [address] [place] [address] acknowledges that she has consented to an online visit or consultation  She understands that the online visit is based solely on information provided by her, and that, in the absence of a face-to-face physical evaluation by the physician, the diagnosis she receives is both limited and provisional in terms of accuracy and completeness  This is not intended to replace a full medical face-to-face evaluation by the physician  Ro Kaitlynn Tabor understands and accepts these terms

## 2021-05-26 ENCOUNTER — OFFICE VISIT (OUTPATIENT)
Dept: INTERNAL MEDICINE CLINIC | Facility: CLINIC | Age: 26
End: 2021-05-26
Payer: COMMERCIAL

## 2021-05-26 VITALS
HEIGHT: 69 IN | HEART RATE: 70 BPM | BODY MASS INDEX: 22.69 KG/M2 | SYSTOLIC BLOOD PRESSURE: 110 MMHG | DIASTOLIC BLOOD PRESSURE: 70 MMHG | WEIGHT: 153.2 LBS | TEMPERATURE: 97.8 F | OXYGEN SATURATION: 98 %

## 2021-05-26 DIAGNOSIS — Z00.00 ANNUAL PHYSICAL EXAM: Primary | ICD-10-CM

## 2021-05-26 DIAGNOSIS — D22.9 NEVUS: ICD-10-CM

## 2021-05-26 DIAGNOSIS — R61 NIGHT SWEATS: ICD-10-CM

## 2021-05-26 PROCEDURE — 99395 PREV VISIT EST AGE 18-39: CPT | Performed by: INTERNAL MEDICINE

## 2021-05-26 PROCEDURE — 3725F SCREEN DEPRESSION PERFORMED: CPT | Performed by: INTERNAL MEDICINE

## 2021-05-26 NOTE — PROGRESS NOTES
[facility] OF Bridgett Quick    NAME: Rodolfo Lucia  AGE: 30 y o  SEX: female  : 1995     DATE: 2021     Assessment and Plan:     Problem List Items Addressed This Visit     None      Visit Diagnoses     Annual physical exam    -  Primary    Nevus        Relevant Orders    Ambulatory referral to Dermatology    Night sweats        No other B symptoms  Exam is unremarkable  Continue to observe          Immunizations and preventive care screenings were discussed with patient today  Appropriate education was printed on patient's after visit summary  Counseling:  · Continue healthy diet and regular exercise  Continue Lexapro 20mg and regular therapy         Return in about 6 months (around 2021) for cancel July appt  Chief Complaint:     Chief Complaint   Patient presents with    Annual Exam      History of Present Illness:     Adult Annual Physical   Patient here for a comprehensive physical exam  The patient reports no problems  Diet and Physical Activity  · Diet/Nutrition: well balanced diet  · Exercise: walking, 3-4 times a week on average and yoga  Depression Screening  PHQ-9 Depression Screening    PHQ-9:   Frequency of the following problems over the past two weeks:      Little interest or pleasure in doing things: 0 - not at all  Feeling down, depressed, or hopeless: 1 - several days  Trouble falling or staying asleep, or sleeping too much: 1 - several days  Feeling tired or having little energy: 1 - several days  Poor appetite or overeatin - not at all  Feeling bad about yourself - or that you are a failure or have let yourself or your family down: 0 - not at all  Trouble concentrating on things, such as reading the newspaper or watching television: 0 - not at all  Moving or speaking so slowly that other people could have noticed   Or the opposite - being so fidgety or restless that you have been moving around a lot more than usual: 0 - not at all  Thoughts that you would be better off dead, or of hurting yourself in some way: 0 - not at all  PHQ-2 Score: 1  PHQ-9 Score: 3       General Health  · Sleep: sleeps well  · Hearing: normal - bilateral   · Vision: no vision problems  · Dental: regular dental visits  /GYN Health  · Last menstrual period: May 23  · Contraceptive method: barrier methods  · History of STDs?: no      Review of Systems:     Review of Systems   Constitutional: Negative for appetite change, chills, fatigue, fever and unexpected weight change  Night sweats for a few months since increase in Lexapro to 20mg   HENT: Negative for congestion, sneezing and sore throat  Respiratory: Negative for cough and shortness of breath  Cardiovascular: Negative for chest pain and palpitations  Gastrointestinal: Negative for abdominal pain, constipation, diarrhea, nausea and vomiting  Genitourinary: Positive for menstrual problem (bleeding controlled on tranexamic acid; regular)  Negative for difficulty urinating  Musculoskeletal: Negative for arthralgias and myalgias  Skin:        Mole right thigh that has changed in color  Large mole on her back  Cyst on scalp    Neurological: Negative for dizziness and headaches  Psychiatric/Behavioral: Negative for sleep disturbance          Better on Lexapro 20mg and counseling every 2 weeks      Past Medical History:     Past Medical History:   Diagnosis Date    Anxiety     Depression     Lyme disease     as a child     Vaccine for human papilloma virus (HPV) types 6, 11, 12, and 18 administered 2020    Varicella vaccination       Past Surgical History:     Past Surgical History:   Procedure Laterality Date    NO PAST SURGERIES        Social History:     E-Cigarette/Vaping    E-Cigarette Use Never User      E-Cigarette/Vaping Substances    Nicotine No     THC No     CBD No     Flavoring No     Other No     Unknown No Social History     Socioeconomic History    Marital status: Single     Spouse name: None    Number of children: 0    Years of education: None    Highest education level: Bachelor's degree (e g , BA, AB, BS)   Occupational History    Occupation: teacher     Comment: reading, pa   Social Needs    Financial resource strain: None    Food insecurity     Worry: None     Inability: None    Transportation needs     Medical: None     Non-medical: None   Tobacco Use    Smoking status: Never Smoker    Smokeless tobacco: Never Used   Substance and Sexual Activity    Alcohol use: Yes     Alcohol/week: 8 0 standard drinks     Types: 4 Glasses of wine, 4 Cans of beer per week     Frequency: 2-3 times a week     Drinks per session: 1 or 2     Binge frequency: Less than monthly    Drug use: Never    Sexual activity: Yes     Partners: Male     Birth control/protection: Condom Male     Comment: lifetime partners: 1   Lifestyle    Physical activity     Days per week: None     Minutes per session: None    Stress: None   Relationships    Social connections     Talks on phone: None     Gets together: None     Attends Anglican service: None     Active member of club or organization: None     Attends meetings of clubs or organizations: None     Relationship status: None    Intimate partner violence     Fear of current or ex partner: None     Emotionally abused: None     Physically abused: None     Forced sexual activity: None   Other Topics Concern    None   Social History Narrative    Denominational preferences: none    Accepts blood products        Exercise: 3-4 a week via yoga ~30 min    Calcium: 1 cup of almond milk   No yogurt, cheese or MV      Family History:     Family History   Problem Relation Age of Onset    Hashimoto's thyroiditis Mother     Hypothyroidism Mother     No Known Problems Father     Diabetes Maternal Grandfather     Diabetes type I Sister     No Known Problems Brother     Dementia Maternal Grandmother     Dementia Paternal Grandmother     No Known Problems Paternal Grandfather     OCD Sister     No Known Problems Sister     No Known Problems Sister     No Known Problems Brother     No Known Problems Brother     No Known Problems Brother     Breast cancer Neg Hx     Ovarian cancer Neg Hx     Colon cancer Neg Hx       Current Medications:     Current Outpatient Medications   Medication Sig Dispense Refill    escitalopram (LEXAPRO) 20 mg tablet Take 1 tablet (20 mg total) by mouth daily 90 tablet 1    Tranexamic Acid 650 MG TABS Take 2 tablets (1,300 mg total) by mouth every 8 (eight) hours as needed (for heavy bleeding up to 5 days of each menstrual cycle) 90 tablet 2     No current facility-administered medications for this visit  Allergies:     No Known Allergies   Physical Exam:     /70   Pulse 70   Temp 97 8 °F (36 6 °C)   Ht 5' 8 5" (1 74 m)   Wt 69 5 kg (153 lb 3 2 oz)   SpO2 98%   BMI 22 96 kg/m²     Physical Exam  Constitutional:       General: She is not in acute distress  Appearance: She is well-developed  She is not ill-appearing, toxic-appearing or diaphoretic  HENT:      Head: Normocephalic and atraumatic  Comments: Cyst on the scalp     Right Ear: External ear normal  There is no impacted cerumen  Left Ear: External ear normal  There is no impacted cerumen  Eyes:      Conjunctiva/sclera: Conjunctivae normal    Neck:      Musculoskeletal: Neck supple  Cardiovascular:      Rate and Rhythm: Normal rate and regular rhythm  Heart sounds: Normal heart sounds  No murmur  Pulmonary:      Effort: Pulmonary effort is normal  No respiratory distress  Breath sounds: Normal breath sounds  No stridor  No wheezing or rales  Abdominal:      General: There is no distension  Palpations: Abdomen is soft  There is no mass  Tenderness: There is no abdominal tenderness  There is no guarding or rebound     Musculoskeletal:      Right lower leg: No edema  Left lower leg: No edema  Skin:     General: Skin is warm and dry  Comments: Pea sized reddish nevus on the back  Pinhead sized deep red raised nevus on the right thigh   Neurological:      Mental Status: She is alert and oriented to person, place, and time  Psychiatric:         Behavior: Behavior normal          Thought Content:  Thought content normal          Judgment: Judgment normal       Comments: PHQ 9=3  GISEL 7=4          Jai Panchal MD   [facility]

## 2021-05-26 NOTE — PATIENT INSTRUCTIONS

## 2021-05-27 ENCOUNTER — CONSULT (OUTPATIENT)
Dept: DERMATOLOGY | Facility: CLINIC | Age: 26
End: 2021-05-27
Payer: COMMERCIAL

## 2021-05-27 VITALS — BODY MASS INDEX: 22.22 KG/M2 | WEIGHT: 150 LBS | TEMPERATURE: 98.6 F | HEIGHT: 69 IN

## 2021-05-27 DIAGNOSIS — D22.9 MULTIPLE MELANOCYTIC NEVI: Primary | ICD-10-CM

## 2021-05-27 DIAGNOSIS — D18.01 CHERRY ANGIOMA: ICD-10-CM

## 2021-05-27 PROCEDURE — 3008F BODY MASS INDEX DOCD: CPT | Performed by: DERMATOLOGY

## 2021-05-27 PROCEDURE — 99244 OFF/OP CNSLTJ NEW/EST MOD 40: CPT | Performed by: DERMATOLOGY

## 2021-05-27 PROCEDURE — 1036F TOBACCO NON-USER: CPT | Performed by: DERMATOLOGY

## 2021-05-27 NOTE — PROGRESS NOTES
[address] [facility] Note     Patient Name: Genna Kaiser  Encounter Date: 05/27/2021     Have you been cared for by a [facility] Dermatologist in the last 3 years and, if so, which one? No    · Have you traveled outside of the [address] [place] [address] in the past 3 months or outside of the [facility] area in the last 2 weeks? No     May we call your Preferred Phone number to discuss your specific medical information? Yes     May we leave a detailed message that includes your specific medical information? Yes      Today's Chief Concerns:   Concern #1:  Skin check    Concern #2:   Spot of concern on right thigh     Past Medical History:  Have you personally ever had or currently have any of the following? · Skin cancer (such as Melanoma, Basal Cell Carcinoma, Squamous Cell Carcinoma? (If Yes, please provide more detail)- No  · Eczema: No  · Psoriasis: No  · HIV/AIDS: No  · Hepatitis B or C: No  · Tuberculosis: No  · Systemic Immunosuppression such as Diabetes, Biologic or Immunotherapy, Chemotherapy, Organ Transplantation, Bone Marrow Transplantation (If YES, please provide more detail): No  · Radiation Treatment (If YES, please provide more detail): No  · Any other major medical conditions/concerns? (If Yes, which types)- No    Social History:     What is/was your primary occupation? Teacher      What are your hobbies/past-times? Yoga, walking     Family History:  Have any of your "first degree relatives" (parent, brother, sister, or child) had any of the following       · Skin cancer such as Melanoma or Merkel Cell Carcinoma or Pancreatic Cancer? No  · Eczema, Asthma, Hay Fever or Seasonal Allergies: YES, asthma , seasonal allergies   · Psoriasis or Psoriatic Arthritis: YES, mother   · Do any other medical conditions seem to run in your family? If Yes, what condition and which relatives?   No    Current Medications:   (please update all dermatological medications before printing patient's AVS!)      Current Outpatient Medications:     escitalopram (LEXAPRO) 20 mg tablet, Take 1 tablet (20 mg total) by mouth daily, Disp: 90 tablet, Rfl: 1    Tranexamic Acid 650 MG TABS, Take 2 tablets (1,300 mg total) by mouth every 8 (eight) hours as needed (for heavy bleeding up to 5 days of each menstrual cycle), Disp: 90 tablet, Rfl: 2      Review of Systems:  Have you recently had or currently have any of the following? If YES, what are you doing for the problem? · Fever, chills or unintended weight loss: No  · Sudden loss or change in your vision: No  · Nausea, vomiting or blood in your stool: No  · Painful or swollen joints: No  · Wheezing or cough: No  · Changing mole or non-healing wound: YES,   · Nosebleeds: No  · Excessive sweating: No  · Easy or prolonged bleeding? No  · Over the last 2 weeks, how often have you been bothered by the following problems? · Taking little interest or pleasure in doing things: 1 - Not at All  · Feeling down, depressed, or hopeless: 1 - Not at All  · Rapid heartbeat with epinephrine:  No    · FEMALES ONLY:    · Are you pregnant or planning to become pregnant? No  · Are you currently or planning to be nursing or breast feeding? No    · Any known allergies? No Known Allergies      Physical Exam:     Was a chaperone (Derm Clinical Assistant) present throughout the entire Physical Exam? Yes     Did the Dermatology Team specifically  the patient on the importance of a Full Skin Exam to be sure that nothing is missed clinically?  Yes}  o Did the patient ultimately request or accept a Full Skin Exam?  Yes  o Did the patient specifically refuse to have the areas "under-the-bra" examined by the Dermatologist? No  o Did the patient specifically refuse to have the areas "under-the-underwear" examined by the Dermatologist? No    CONSTITUTIONAL:   Vitals:    05/27/21 1409   Temp: 98 6 °F (37 °C)   Weight: 68 kg (150 lb)   Height: 5' 8 5" (1 74 m)         PSYCH: Normal mood and affect  EYES: Normal conjunctiva  ENT: Normal lips and oral mucosa  CARDIOVASCULAR: No edema  RESPIRATORY: Normal respirations  HEME/LYMPH/IMMUNO:  No regional lymphadenopathy except as noted below in "ASSESSMENT AND PLAN BY DIAGNOSIS"    SKIN:  FULL ORGAN SYSTEM EXAM  Hair, Scalp, Ears, Face Normal except as noted below in Assessment   Neck, Cervical Chain Nodes Normal except as noted below in Assessment   Right Arm/Hand/Fingers Normal except as noted below in Assessment   Left Arm/Hand/Fingers Normal except as noted below in Assessment   Chest/Breasts/Axillae Viewed areas Normal except as noted below in Assessment   Abdomen, Umbilicus Normal except as noted below in Assessment   Back/Spine Normal except as noted below in Assessment   Groin/Genitalia/Buttocks NOT EXAMINED   Right Leg, Foot, Toes Normal except as noted below in Assessment   Left Leg, Foot, Toes Normal except as noted below in Assessment        Assessment and Plan by Diagnosis:    History of Present Condition:     Duration:  How long has this been an issue for you?    o Few moths    Location Affected:  Where on the body is this affecting you?    o  right thigh    Quality:  Is there any bleeding, pain, itch, burning/irritation, or redness associated with the skin lesion? o  denies    Severity:  Describe any bleeding, pain, itch, burning/irritation, or redness on a scale of 1 to 10 (with 10 being the worst)  o  0   Timing:  Does this condition seem to be there pretty constantly or do you notice it more at specific times throughout the day?     o  constantly    Context:  Have you ever noticed that this condition seems to be associated with specific activities you do?    o  denies    Modifying Factors:    o Anything that seems to make the condition worse?    -  denies   o What have you tried to do to make the condition better?    -  denies    Associated Signs and Symptoms:  Does this skin lesion seem to be associated with any of the following:    MELANOCYTIC NEVI ("Moles")    Physical Exam:   Anatomic Location Affected:   Mostly on sun-exposed areas of the trunk and extremities   Morphological Description:  Scattered, 1-4mm round to ovoid, symmetrical-appearing, even bordered, skin colored to dark brown macules/papules, mostly in sun-exposed areas   Pertinent Positives:   Pertinent Negatives: Additional History of Present Condition:      Assessment and Plan:  Based on a thorough discussion of this condition and the management approach to it (including a comprehensive discussion of the known risks, side effects and potential benefits of treatment), the patient (family) agrees to implement the following specific plan:   When outside we recommend using a wide brim hat, sunglasses, long sleeve and pants, sunscreen with SPF 87+ with reapplication every 2 hours, or SPF specific clothing    Benign, reassured   Annual skin check     Melanocytic Nevi  Melanocytic nevi ("moles") are tan or brown, raised or flat areas of the skin which have an increased number of melanocytes  Melanocytes are the cells in our body which make pigment and account for skin color  Some moles are present at birth (I e , "congenital nevi"), while others come up later in life (i e , "acquired nevi")  The sun can stimulate the body to make more moles  Sunburns are not the only thing that triggers more moles  Chronic sun exposure can do it too  Clinically distinguishing a healthy mole from melanoma may be difficult, even for experienced dermatologists  The "ABCDE's" of moles have been suggested as a means of helping to alert a person to a suspicious mole and the possible increased risk of melanoma  The suggestions for raising alert are as follows:    Asymmetry: Healthy moles tend to be symmetric, while melanomas are often asymmetric    Asymmetry means if you draw a line through the mole, the two halves do not match in color, size, shape, or surface texture  Asymmetry can be a result of rapid enlargement of a mole, the development of a raised area on a previously flat lesion, scaling, ulceration, bleeding or scabbing within the mole  Any mole that starts to demonstrate "asymmetry" should be examined promptly by a board certified dermatologist      Border: Healthy moles tend to have discrete, even borders  The border of a melanoma often blends into the normal skin and does not sharply delineate the mole from normal skin  Any mole that starts to demonstrate "uneven borders" should be examined promptly by a board certified dermatologist      Color: Healthy moles tend to be one color throughout  Melanomas tend to be made up of different colors ranging from dark black, blue, white, or red  Any mole that demonstrates a color change should be examined promptly by a board certified dermatologist      Diameter: Healthy moles tend to be smaller than 0 6 cm in size; an exception are "congenital nevi" that can be larger  Melanomas tend to grow and can often be greater than 0 6 cm (1/4 of an inch, or the size of a pencil eraser)  This is only a guideline, and many normal moles may be larger than 0 6 cm without being unhealthy  Any mole that starts to change in size (small to bigger or bigger to smaller) should be examined promptly by a board certified dermatologist      Evolving: Healthy moles tend to "stay the same "  Melanomas may often show signs of change or evolution such as a change in size, shape, color, or elevation  Any mole that starts to itch, bleed, crust, burn, hurt, or ulcerate or demonstrate a change or evolution should be examined promptly by a board certified dermatologist           Fidencio Roque    Physical Exam:   Anatomic Location Affected:   right upper thigh, back    Morphological Description:   cherry red, 1-4 mm papule   Pertinent Positives:   Pertinent Negatives:     Additional History of Present Condition: Assessment and Plan:  Based on a thorough discussion of this condition and the management approach to it (including a comprehensive discussion of the known risks, side effects and potential benefits of treatment), the patient (family) agrees to implement the following specific plan:   Monitor for changes   Benign, reassured       Assessment and Plan:    Cherry angioma, also known as [employer] spots, are benign vascular skin lesions  A "cherry angioma" is a firm red, blue or purple papule, 0 1-1 cm in diameter  When thrombosed, they can appear black in colour until evaluated with a dermatoscope when the red or purple colour is more easily seen  Cherry angioma may develop on any part of the body but most often appear on the scalp, face, lips and trunk  An angioma is due to proliferating endothelial cells; these are the cells that line the inside of a blood vessel  Angiomas can arise in early life or later in life; the most common type of angioma is a cherry angioma  Cherry angiomas are very common in males and females of any age or race  They are more noticeable in white skin than in skin of colour  They markedly increase in number from about the age of 36  There may be a family history of similar lesions  Eruptive cherry angiomas have been rarely reported to be associated with internal malignancy  The cause of angiomas is unknown  Genetic analysis of cherry angiomas has shown that they frequently carry specific somatic missense mutations in the GNAQ and GNA11 (Q209H) genes, which are involved in other vascular and melanocytic proliferations  FBSE with benign nevi and cherry angiomas noted  Discussed benign quality and treatments  Recommended return for new or changing lesions      Scribe Attestation    I,:  Joyce Solano MA am acting as a scribe while in the presence of the attending physician :       I,:  Javier Elizalde MD personally performed the services described in this documentation as scribed in my presence :

## 2021-05-27 NOTE — PATIENT INSTRUCTIONS
Assessment and Plan:  Based on a thorough discussion of this condition and the management approach to it (including a comprehensive discussion of the known risks, side effects and potential benefits of treatment), the patient (family) agrees to implement the following specific plan:   When outside we recommend using a wide brim hat, sunglasses, long sleeve and pants, sunscreen with SPF 75+ with reapplication every 2 hours, or SPF specific clothing    Benign, reassured   Annual skin check     Melanocytic Nevi  Melanocytic nevi ("moles") are tan or brown, raised or flat areas of the skin which have an increased number of melanocytes  Melanocytes are the cells in our body which make pigment and account for skin color  Some moles are present at birth (I e , "congenital nevi"), while others come up later in life (i e , "acquired nevi")  The sun can stimulate the body to make more moles  Sunburns are not the only thing that triggers more moles  Chronic sun exposure can do it too  Clinically distinguishing a healthy mole from melanoma may be difficult, even for experienced dermatologists  The "ABCDE's" of moles have been suggested as a means of helping to alert a person to a suspicious mole and the possible increased risk of melanoma  The suggestions for raising alert are as follows:    Asymmetry: Healthy moles tend to be symmetric, while melanomas are often asymmetric  Asymmetry means if you draw a line through the mole, the two halves do not match in color, size, shape, or surface texture  Asymmetry can be a result of rapid enlargement of a mole, the development of a raised area on a previously flat lesion, scaling, ulceration, bleeding or scabbing within the mole  Any mole that starts to demonstrate "asymmetry" should be examined promptly by a board certified dermatologist      Border: Healthy moles tend to have discrete, even borders    The border of a melanoma often blends into the normal skin and does not sharply delineate the mole from normal skin  Any mole that starts to demonstrate "uneven borders" should be examined promptly by a board certified dermatologist      Color: Healthy moles tend to be one color throughout  Melanomas tend to be made up of different colors ranging from dark black, blue, white, or red  Any mole that demonstrates a color change should be examined promptly by a board certified dermatologist      Diameter: Healthy moles tend to be smaller than 0 6 cm in size; an exception are "congenital nevi" that can be larger  Melanomas tend to grow and can often be greater than 0 6 cm (1/4 of an inch, or the size of a pencil eraser)  This is only a guideline, and many normal moles may be larger than 0 6 cm without being unhealthy  Any mole that starts to change in size (small to bigger or bigger to smaller) should be examined promptly by a board certified dermatologist      Evolving: Healthy moles tend to "stay the same "  Melanomas may often show signs of change or evolution such as a change in size, shape, color, or elevation  Any mole that starts to itch, bleed, crust, burn, hurt, or ulcerate or demonstrate a change or evolution should be examined promptly by a board certified dermatologist     Assessment and Plan:  Based on a thorough discussion of this condition and the management approach to it (including a comprehensive discussion of the known risks, side effects and potential benefits of treatment), the patient (family) agrees to implement the following specific plan:   Monitor for changes   Benign, reassured       Assessment and Plan:    Cherry angioma, also known as [employer] spots, are benign vascular skin lesions  A "cherry angioma" is a firm red, blue or purple papule, 0 1-1 cm in diameter  When thrombosed, they can appear black in colour until evaluated with a dermatoscope when the red or purple colour is more easily seen   Cherry angioma may develop on any part of the body but most often appear on the scalp, face, lips and trunk  An angioma is due to proliferating endothelial cells; these are the cells that line the inside of a blood vessel  Angiomas can arise in early life or later in life; the most common type of angioma is a cherry angioma  Cherry angiomas are very common in males and females of any age or race  They are more noticeable in white skin than in skin of colour  They markedly increase in number from about the age of 36  There may be a family history of similar lesions  Eruptive cherry angiomas have been rarely reported to be associated with internal malignancy  The cause of angiomas is unknown  Genetic analysis of cherry angiomas has shown that they frequently carry specific somatic missense mutations in the GNAQ and GNA11 (Q209H) genes, which are involved in other vascular and melanocytic proliferations

## 2021-07-26 DIAGNOSIS — F33.1 MODERATE EPISODE OF RECURRENT MAJOR DEPRESSIVE DISORDER (HCC): ICD-10-CM

## 2021-07-26 DIAGNOSIS — F41.1 GAD (GENERALIZED ANXIETY DISORDER): ICD-10-CM

## 2021-07-26 RX ORDER — ESCITALOPRAM OXALATE 20 MG/1
20 TABLET ORAL DAILY
Qty: 90 TABLET | Refills: 0 | Status: SHIPPED | OUTPATIENT
Start: 2021-07-26 | End: 2021-10-19

## 2021-10-19 DIAGNOSIS — F33.1 MODERATE EPISODE OF RECURRENT MAJOR DEPRESSIVE DISORDER (HCC): ICD-10-CM

## 2021-10-19 DIAGNOSIS — F41.1 GAD (GENERALIZED ANXIETY DISORDER): ICD-10-CM

## 2021-10-19 RX ORDER — ESCITALOPRAM OXALATE 20 MG/1
TABLET ORAL
Qty: 90 TABLET | Refills: 0 | Status: SHIPPED | OUTPATIENT
Start: 2021-10-19 | End: 2022-01-14

## 2021-11-29 ENCOUNTER — OFFICE VISIT (OUTPATIENT)
Dept: INTERNAL MEDICINE CLINIC | Facility: CLINIC | Age: 26
End: 2021-11-29
Payer: COMMERCIAL

## 2021-11-29 VITALS
WEIGHT: 165.4 LBS | TEMPERATURE: 97.8 F | HEART RATE: 75 BPM | SYSTOLIC BLOOD PRESSURE: 112 MMHG | OXYGEN SATURATION: 99 % | BODY MASS INDEX: 24.78 KG/M2 | DIASTOLIC BLOOD PRESSURE: 68 MMHG

## 2021-11-29 DIAGNOSIS — F33.42 RECURRENT MAJOR DEPRESSIVE DISORDER, IN FULL REMISSION (HCC): ICD-10-CM

## 2021-11-29 DIAGNOSIS — F41.1 GAD (GENERALIZED ANXIETY DISORDER): Primary | ICD-10-CM

## 2021-11-29 PROBLEM — F33.1 MODERATE EPISODE OF RECURRENT MAJOR DEPRESSIVE DISORDER (HCC): Status: RESOLVED | Noted: 2020-09-14 | Resolved: 2021-11-29

## 2021-11-29 PROCEDURE — 99213 OFFICE O/P EST LOW 20 MIN: CPT | Performed by: INTERNAL MEDICINE

## 2022-01-14 DIAGNOSIS — F33.1 MODERATE EPISODE OF RECURRENT MAJOR DEPRESSIVE DISORDER (HCC): ICD-10-CM

## 2022-01-14 DIAGNOSIS — F41.1 GAD (GENERALIZED ANXIETY DISORDER): ICD-10-CM

## 2022-01-14 RX ORDER — ESCITALOPRAM OXALATE 20 MG/1
TABLET ORAL
Qty: 90 TABLET | Refills: 0 | Status: SHIPPED | OUTPATIENT
Start: 2022-01-14 | End: 2022-04-15

## 2022-02-11 DIAGNOSIS — N92.0 MENORRHAGIA WITH REGULAR CYCLE: ICD-10-CM

## 2022-02-16 RX ORDER — TRANEXAMIC ACID 650 1/1
1300 TABLET ORAL EVERY 8 HOURS PRN
Qty: 90 TABLET | Refills: 0 | Status: SHIPPED | OUTPATIENT
Start: 2022-02-16 | End: 2022-07-01 | Stop reason: SDUPTHER

## 2022-03-02 ENCOUNTER — OFFICE VISIT (OUTPATIENT)
Dept: INTERNAL MEDICINE CLINIC | Facility: CLINIC | Age: 27
End: 2022-03-02
Payer: COMMERCIAL

## 2022-03-02 VITALS
OXYGEN SATURATION: 99 % | BODY MASS INDEX: 24.79 KG/M2 | RESPIRATION RATE: 16 BRPM | SYSTOLIC BLOOD PRESSURE: 108 MMHG | HEART RATE: 81 BPM | DIASTOLIC BLOOD PRESSURE: 70 MMHG | TEMPERATURE: 97.8 F | WEIGHT: 167.4 LBS | HEIGHT: 69 IN

## 2022-03-02 DIAGNOSIS — F33.2 SEVERE EPISODE OF RECURRENT MAJOR DEPRESSIVE DISORDER, WITHOUT PSYCHOTIC FEATURES (HCC): Primary | ICD-10-CM

## 2022-03-02 DIAGNOSIS — F41.1 GAD (GENERALIZED ANXIETY DISORDER): ICD-10-CM

## 2022-03-02 PROBLEM — F33.9 DEPRESSION, RECURRENT (HCC): Status: ACTIVE | Noted: 2022-03-02

## 2022-03-02 PROCEDURE — 1036F TOBACCO NON-USER: CPT | Performed by: INTERNAL MEDICINE

## 2022-03-02 PROCEDURE — 3008F BODY MASS INDEX DOCD: CPT | Performed by: INTERNAL MEDICINE

## 2022-03-02 PROCEDURE — 3725F SCREEN DEPRESSION PERFORMED: CPT | Performed by: INTERNAL MEDICINE

## 2022-03-02 PROCEDURE — 99214 OFFICE O/P EST MOD 30 MIN: CPT | Performed by: INTERNAL MEDICINE

## 2022-03-02 RX ORDER — BUPROPION HYDROCHLORIDE 150 MG/1
150 TABLET ORAL DAILY
Qty: 30 TABLET | Refills: 2 | Status: SHIPPED | OUTPATIENT
Start: 2022-03-02 | End: 2022-04-01 | Stop reason: SDUPTHER

## 2022-03-02 NOTE — PROGRESS NOTES
Assessment/Plan:    Severe episode of recurrent major depressive disorder, without psychotic features ([address] )  Start bupropion 150mg  For now, continue escitalopram         Problem List Items Addressed This Visit        Other    GISEL (generalized anxiety disorder)    Relevant Medications    buPROPion (Wellbutrin XL) 150 mg 24 hr tablet    Severe episode of recurrent major depressive disorder, without psychotic features ([address] ) - Primary     Start bupropion 150mg  For now, continue escitalopram         Relevant Medications    buPROPion (Wellbutrin XL) 150 mg 24 hr tablet            Subjective:      Patient ID: Nicki Blank is a 27 y o  female  HPI  Increasing anxiety fatigue headaches for the past 2 weeks, possibly longer  Missing work a day a week bec of fatigue, anxiety  She is a   Sleeping ok  +weight gain which has also made her feel worse  Poor motivation  Continues to see the therapist biweekly  She has been on Lexapro since Sept 2020 and has done well on it overall    The following portions of the patient's history were reviewed and updated as appropriate: allergies, current medications, past family history, past medical history, past social history, past surgical history and problem list     Review of Systems   Constitutional: Positive for fatigue  Musculoskeletal: Positive for back pain (right upper back pain; low back pain, right leg)  Psychiatric/Behavioral: Positive for dysphoric mood  The patient is nervous/anxious  Objective:      /70   Pulse 81   Temp 97 8 °F (36 6 °C)   Resp 16   Ht 5' 9" (1 753 m)   Wt 75 9 kg (167 lb 6 4 oz)   SpO2 99%   BMI 24 72 kg/m²          Physical Exam  Constitutional:       General: She is not in acute distress  Appearance: She is well-developed  She is not ill-appearing, toxic-appearing or diaphoretic  HENT:      Head: Normocephalic and atraumatic     Eyes:      Conjunctiva/sclera: Conjunctivae normal  Cardiovascular:      Rate and Rhythm: Normal rate and regular rhythm  Heart sounds: Normal heart sounds  No murmur heard  Pulmonary:      Effort: Pulmonary effort is normal  No respiratory distress  Breath sounds: Normal breath sounds  No wheezing or rales  Abdominal:      General: There is no distension  Palpations: Abdomen is soft  There is no mass  Tenderness: There is no abdominal tenderness  There is no guarding or rebound  Musculoskeletal:      Cervical back: Neck supple  Right lower leg: No edema  Left lower leg: No edema  Skin:     General: Skin is warm and dry  Neurological:      Mental Status: She is alert and oriented to person, place, and time  Psychiatric:         Behavior: Behavior normal          Thought Content:  Thought content normal          Judgment: Judgment normal       Comments: PHQ0=17 GAD7=15

## 2022-04-01 ENCOUNTER — OFFICE VISIT (OUTPATIENT)
Dept: INTERNAL MEDICINE CLINIC | Facility: CLINIC | Age: 27
End: 2022-04-01
Payer: COMMERCIAL

## 2022-04-01 VITALS
TEMPERATURE: 97.5 F | SYSTOLIC BLOOD PRESSURE: 118 MMHG | WEIGHT: 167 LBS | RESPIRATION RATE: 14 BRPM | BODY MASS INDEX: 24.73 KG/M2 | DIASTOLIC BLOOD PRESSURE: 66 MMHG | HEART RATE: 80 BPM | OXYGEN SATURATION: 98 % | HEIGHT: 69 IN

## 2022-04-01 DIAGNOSIS — F33.2 SEVERE EPISODE OF RECURRENT MAJOR DEPRESSIVE DISORDER, WITHOUT PSYCHOTIC FEATURES (HCC): Primary | ICD-10-CM

## 2022-04-01 DIAGNOSIS — F41.1 GAD (GENERALIZED ANXIETY DISORDER): ICD-10-CM

## 2022-04-01 PROBLEM — F33.9 DEPRESSION, RECURRENT (HCC): Status: RESOLVED | Noted: 2022-03-02 | Resolved: 2022-04-01

## 2022-04-01 PROCEDURE — 99214 OFFICE O/P EST MOD 30 MIN: CPT | Performed by: INTERNAL MEDICINE

## 2022-04-01 PROCEDURE — 3725F SCREEN DEPRESSION PERFORMED: CPT | Performed by: INTERNAL MEDICINE

## 2022-04-01 PROCEDURE — 1036F TOBACCO NON-USER: CPT | Performed by: INTERNAL MEDICINE

## 2022-04-01 PROCEDURE — 3008F BODY MASS INDEX DOCD: CPT | Performed by: INTERNAL MEDICINE

## 2022-04-01 RX ORDER — BUPROPION HYDROCHLORIDE 300 MG/1
300 TABLET ORAL DAILY
Qty: 30 TABLET | Refills: 2 | Status: SHIPPED | OUTPATIENT
Start: 2022-04-01 | End: 2022-04-25 | Stop reason: SDUPTHER

## 2022-04-01 NOTE — PROGRESS NOTES
Assessment/Plan:  Agrees to increasing the Wellbutrin at this time to 300mg  Continue Lexapro  Continue talking to therapist  Discussed weaning off Wellbutrin in ineffective after about a month       Problem List Items Addressed This Visit        Other    GISEL (generalized anxiety disorder)    Relevant Medications    buPROPion (Wellbutrin XL) 300 mg 24 hr tablet    Severe episode of recurrent major depressive disorder, without psychotic features ([address] ) - Primary    Relevant Medications    buPROPion (Wellbutrin XL) 300 mg 24 hr tablet            Subjective:      Patient ID: Patricia Capps is a 27 y o  female  HPI  In the past week sensation of being unable to get deep breaths  Tightness in the throat, voice hoarseness at times  She has tried Claritin which helps  Depression and anxiety unchanged with Wellbutrin 150mg that she started a month ago  Work remains very stressful and is the source of her anxiety and depression elsy with an  reminding her of her mother with whom she has a poor relationship  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  She continues to talk to her therapist  Denies adverse reactions from Wellbutrin    The following portions of the patient's history were reviewed and updated as appropriate: allergies, current medications, past family history, past medical history, past social history, past surgical history and problem list     Review of Systems   Constitutional: Negative for chills and fever  HENT: Positive for voice change  Negative for congestion and postnasal drip  Respiratory: Negative for cough and shortness of breath  Cardiovascular: Negative for chest pain and palpitations  Gastrointestinal: Negative for abdominal pain, constipation and diarrhea  Neurological: Negative for dizziness and headaches  Psychiatric/Behavioral: Positive for dysphoric mood  Negative for sleep disturbance  The patient is nervous/anxious  Objective:      /66   Pulse 80   Temp 97 5 °F (36 4 °C)   Resp 14   Ht 5' 9" (1 753 m)   Wt 75 8 kg (167 lb)   SpO2 98%   BMI 24 66 kg/m²          Physical Exam  Constitutional:       General: She is not in acute distress  Appearance: She is well-developed  She is not ill-appearing, toxic-appearing or diaphoretic  HENT:      Head: Normocephalic and atraumatic  Right Ear: External ear normal  There is no impacted cerumen  Left Ear: External ear normal  There is no impacted cerumen  Eyes:      Conjunctiva/sclera: Conjunctivae normal    Cardiovascular:      Rate and Rhythm: Normal rate and regular rhythm  Heart sounds: Normal heart sounds  No murmur heard  Pulmonary:      Effort: Pulmonary effort is normal  No respiratory distress  Breath sounds: Normal breath sounds  No wheezing or rales  Abdominal:      General: There is no distension  Palpations: Abdomen is soft  There is no mass  Tenderness: There is no abdominal tenderness  There is no guarding or rebound  Musculoskeletal:      Cervical back: Neck supple  Skin:     General: Skin is warm and dry  Neurological:      Mental Status: She is alert and oriented to person, place, and time  Psychiatric:         Behavior: Behavior normal          Thought Content:  Thought content normal          Judgment: Judgment normal       Comments: PHQ9=15 GISEL 7=14

## 2022-04-15 DIAGNOSIS — F41.1 GAD (GENERALIZED ANXIETY DISORDER): ICD-10-CM

## 2022-04-15 DIAGNOSIS — F33.1 MODERATE EPISODE OF RECURRENT MAJOR DEPRESSIVE DISORDER (HCC): ICD-10-CM

## 2022-04-15 RX ORDER — ESCITALOPRAM OXALATE 20 MG/1
TABLET ORAL
Qty: 90 TABLET | Refills: 0 | Status: SHIPPED | OUTPATIENT
Start: 2022-04-15 | End: 2022-07-18

## 2022-04-25 DIAGNOSIS — F33.2 SEVERE EPISODE OF RECURRENT MAJOR DEPRESSIVE DISORDER, WITHOUT PSYCHOTIC FEATURES (HCC): ICD-10-CM

## 2022-04-26 RX ORDER — BUPROPION HYDROCHLORIDE 300 MG/1
300 TABLET ORAL DAILY
Qty: 30 TABLET | Refills: 0 | Status: SHIPPED | OUTPATIENT
Start: 2022-04-26 | End: 2022-05-22

## 2022-04-29 ENCOUNTER — TELEPHONE (OUTPATIENT)
Dept: PSYCHIATRY | Facility: CLINIC | Age: 27
End: 2022-04-29

## 2022-05-06 ENCOUNTER — TELEPHONE (OUTPATIENT)
Dept: PSYCHIATRY | Facility: CLINIC | Age: 27
End: 2022-05-06

## 2022-05-10 ENCOUNTER — TELEPHONE (OUTPATIENT)
Dept: PSYCHIATRY | Facility: CLINIC | Age: 27
End: 2022-05-10

## 2022-05-10 NOTE — TELEPHONE ENCOUNTER
Behavorial Health Outpatient Intake Questions    Referred by: PCP    Please advised interviewee that they need to answer all questions truthfully to allow for best care and any misrepresentations of information may affect their ability to be seen at this clinic   => Was this discussed? Yes     Behavorial Health Outpatient Intake History -     Presenting Problem (in patient's words): Wants a second opinion on medication because she feels like the medicine she takes now isn't working  Anxiety and depression  Are there any developmental disabilities? ? If yes, can they speak to you on the phone? If they are too limited to speak to you on phone, refer out No    Are you taking any psychiatric medications? Yes    => If yes, who prescribes? If yes, are they injectable medications? Lexapro and Wellbutrin    Does the patient have a language barrier or hearing impairment? No    Have you been treated at [facility] by a therapist or a doctor in the past? If yes, who? Yes, talk therapist every 2 weeks; Aster Goode    Has the patient been hospitalized for mental health? No   If yes, how long ago was last hospitalization and where was it? Do you actively use alcohol or marijuana or illegal substances? If yes, what and how much - refer out to Drug and alcohol treatment if use is excessive or daily use of illegal substances No concerns of substance abuse are reported  Do you have a community treatment team or ? No    Legal History-     Does the patient have any history of arrests, [facility] time, or DUIs? No  If Yes-  1) What types of charges? 2) When were they last incarcerated? 3) Are they currently on parole or probation? Minor Child-    Who has custody of the child? Is there a custody agreement? If there is a custody agreement remind parent that they must bring a copy to the first appt or they will not be seen       Intake Team, please check with provider before scheduling if flags come up such as:  - complex case  - legal history (other than DUI)  - communication barrier concerns are present  - if, in your judgment, this needs further review    ACCEPTED as a patient Yes  => Appointment Date: June 2, 2022 at 10 am with Rose Shell    Referred Elsewhere? No    Name of Insurance Co: [employer] ID# [id number]  Insurance Phone #  If ins is primary or secondary  If patient is a minor, parents information such as Name, D  O B of guarantor

## 2022-05-22 DIAGNOSIS — F33.2 SEVERE EPISODE OF RECURRENT MAJOR DEPRESSIVE DISORDER, WITHOUT PSYCHOTIC FEATURES (HCC): ICD-10-CM

## 2022-05-22 RX ORDER — BUPROPION HYDROCHLORIDE 300 MG/1
TABLET ORAL
Qty: 30 TABLET | Refills: 0 | Status: SHIPPED | OUTPATIENT
Start: 2022-05-22 | End: 2022-05-22

## 2022-05-25 ENCOUNTER — RA CDI HCC (OUTPATIENT)
Dept: OTHER | Facility: HOSPITAL | Age: 27
End: 2022-05-25

## 2022-05-25 ENCOUNTER — TELEPHONE (OUTPATIENT)
Dept: PSYCHIATRY | Facility: CLINIC | Age: 27
End: 2022-05-25

## 2022-05-25 NOTE — TELEPHONE ENCOUNTER
Writer contacted patient to reschedule their appointment on 6/2/22 with Ky Vilchis as he will not be in the office       Ro was rescheduled to 6/21

## 2022-06-07 ENCOUNTER — OFFICE VISIT (OUTPATIENT)
Dept: INTERNAL MEDICINE CLINIC | Facility: CLINIC | Age: 27
End: 2022-06-07
Payer: COMMERCIAL

## 2022-06-07 VITALS
BODY MASS INDEX: 25.15 KG/M2 | OXYGEN SATURATION: 98 % | WEIGHT: 169.8 LBS | RESPIRATION RATE: 16 BRPM | DIASTOLIC BLOOD PRESSURE: 64 MMHG | SYSTOLIC BLOOD PRESSURE: 110 MMHG | HEART RATE: 88 BPM | TEMPERATURE: 97 F | HEIGHT: 69 IN

## 2022-06-07 DIAGNOSIS — R39.9 UTI SYMPTOMS: ICD-10-CM

## 2022-06-07 DIAGNOSIS — Z00.00 ANNUAL PHYSICAL EXAM: Primary | ICD-10-CM

## 2022-06-07 DIAGNOSIS — F33.2 SEVERE EPISODE OF RECURRENT MAJOR DEPRESSIVE DISORDER, WITHOUT PSYCHOTIC FEATURES (HCC): ICD-10-CM

## 2022-06-07 LAB
SL AMB  POCT GLUCOSE, UA: ABNORMAL
SL AMB LEUKOCYTE ESTERASE,UA: ABNORMAL
SL AMB POCT BILIRUBIN,UA: ABNORMAL
SL AMB POCT BLOOD,UA: ABNORMAL
SL AMB POCT CLARITY,UA: CLEAR
SL AMB POCT COLOR,UA: YELLOW
SL AMB POCT KETONES,UA: ABNORMAL
SL AMB POCT NITRITE,UA: ABNORMAL
SL AMB POCT PH,UA: 8
SL AMB POCT SPECIFIC GRAVITY,UA: 1.01
SL AMB POCT URINE PROTEIN: 0.15
SL AMB POCT UROBILINOGEN: 3.5

## 2022-06-07 PROCEDURE — 3725F SCREEN DEPRESSION PERFORMED: CPT | Performed by: INTERNAL MEDICINE

## 2022-06-07 PROCEDURE — 3008F BODY MASS INDEX DOCD: CPT | Performed by: INTERNAL MEDICINE

## 2022-06-07 PROCEDURE — 1036F TOBACCO NON-USER: CPT | Performed by: INTERNAL MEDICINE

## 2022-06-07 PROCEDURE — 81002 URINALYSIS NONAUTO W/O SCOPE: CPT | Performed by: INTERNAL MEDICINE

## 2022-06-07 PROCEDURE — 99395 PREV VISIT EST AGE 18-39: CPT | Performed by: INTERNAL MEDICINE

## 2022-06-07 NOTE — PATIENT INSTRUCTIONS

## 2022-06-07 NOTE — PROGRESS NOTES
[facility]    NAME: Penney Severe  AGE: 27 y o  SEX: female  : 1995     DATE: 2022     Assessment and Plan:     Problem List Items Addressed This Visit        Other    Severe episode of recurrent major depressive disorder, without psychotic features ([address] )     Agrees to continue bupropion and Lexapro at this time  Continue seeing therapist biweekly             Other Visit Diagnoses     Annual physical exam    -  Primary    UTI symptoms        Relevant Orders    POCT urine dip (Completed)          Immunizations and preventive care screenings were discussed with patient today  Appropriate education was printed on patient's after visit summary  Counseling:  continue healthy diet and regular exercise  BMI Counseling: Body mass index is 25 08 kg/m²  The BMI is above normal  Nutrition recommendations include 3-5 servings of fruits/vegetables daily, moderation in carbohydrate intake and reducing intake of saturated fat and trans fat  Exercise recommendations include exercising 3-5 times per week  Urine dip is clean  Observe symptoms         Return in about 4 months (around 10/7/2022)  Chief Complaint:     Chief Complaint   Patient presents with    Physical Exam      History of Present Illness:     Adult Annual Physical   Patient here for a comprehensive physical exam  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  Making the decision has been a relief for her  She will work as a [employer] and write part time       Diet and Physical Activity  Diet/Nutrition: well balanced diet  Exercise: 5-7 times a week on average        Depression Screening  PHQ-2/9 Depression Screening    Little interest or pleasure in doing things: 1 - several days  Feeling down, depressed, or hopeless: 1 - several days  Trouble falling or staying asleep, or sleeping too much: 2 - more than half the days  Feeling tired or having little energy: 3 - nearly every day  Poor appetite or overeatin - more than half the days  Feeling bad about yourself - or that you are a failure or have let yourself or your family down: 2 - more than half the days  Trouble concentrating on things, such as reading the newspaper or watching television: 2 - more than half the days  Moving or speaking so slowly that other people could have noticed  Or the opposite - being so fidgety or restless that you have been moving around a lot more than usual: 1 - several days  Thoughts that you would be better off dead, or of hurting yourself in some way: 0 - not at all  PHQ-9 Score: 14   PHQ-9 Interpretation: Moderate depression        General Health  Hearing: normal - bilateral   Vision: goes for regular eye exams  Dental: regular dental visits  /GYN Health  Last menstrual period: regular     Review of Systems:     Review of Systems   Constitutional: Negative for chills and fever  HENT: Negative for rhinorrhea  Respiratory: Negative for cough and shortness of breath  Cardiovascular: Negative for chest pain and palpitations  Gastrointestinal: Negative for abdominal pain, constipation and diarrhea  Genitourinary: Positive for dysuria, frequency and urgency  Strong odor started yesterday   Neurological: Negative for dizziness and headaches  Psychiatric/Behavioral: Positive for dysphoric mood and sleep disturbance        Past Medical History:     Past Medical History:   Diagnosis Date    Anxiety     Depression     [facility] 3/2/2022    Lyme disease     as a child     Vaccine for human papilloma virus (HPV) types 6, 11, 12, and 18 administered     Varicella vaccination       Past Surgical History:     Past Surgical History:   Procedure Laterality Date    NO PAST SURGERIES      WISDOM TOOTH EXTRACTION  2021      Social History:     Social History     Socioeconomic History    Marital status: Single     Spouse name: None  Number of children: 0    Years of education: None    Highest education level: Bachelor's degree (e g , BA, AB, BS)   Occupational History    Occupation: teacher     Comment: reading, pa   Tobacco Use    Smoking status: Never Smoker    Smokeless tobacco: Never Used   Vaping Use    Vaping Use: Never used   Substance and Sexual Activity    Alcohol use: Yes     Alcohol/week: 8 0 standard drinks     Types: 4 Glasses of wine, 4 Cans of beer per week    Drug use: Never    Sexual activity: Yes     Partners: Male     Birth control/protection: Condom Male     Comment: lifetime partners: 1   Other Topics Concern    None   Social History Narrative    Tenriism preferences: none    Accepts blood products        Exercise: 3-4 a week via yoga ~30 min    Calcium: 1 cup of almond milk  No yogurt, cheese or MV     Social Determinants of Health     Financial Resource Strain: Not on file   Food Insecurity: Not on file   Transportation Needs: Not on file   Physical Activity: Not on file   Stress: Not on file   Social Connections: Not on file   Intimate Partner Violence: Not on file   Housing Stability: Not on file      Family History:     Family History   Problem Relation Age of Onset    Hashimoto's thyroiditis Mother     Hypothyroidism Mother     No Known Problems Father     Diabetes Maternal Grandfather     Diabetes type I Sister     No Known Problems Brother     Dementia Maternal Grandmother     Dementia Paternal Grandmother     No Known Problems Paternal Grandfather     OCD Sister     No Known Problems Sister     No Known Problems Sister     No Known Problems Brother     No Known Problems Brother     No Known Problems Brother     Breast cancer Neg Hx     Ovarian cancer Neg Hx     Colon cancer Neg Hx       Current Medications:     Current Outpatient Medications   Medication Sig Dispense Refill    buPROPion (WELLBUTRIN XL) 300 mg 24 hr tablet Take 1 tablet (300 mg total) by mouth in the morning   80 tablet 0    escitalopram (LEXAPRO) 20 mg tablet TAKE 1 TABLET BY MOUTH EVERY DAY 90 tablet 0    Tranexamic Acid 650 MG TABS Take 2 tablets (1,300 mg total) by mouth every 8 (eight) hours as needed (for heavy bleeding up to 5 days of each menstrual cycle) 90 tablet 0     No current facility-administered medications for this visit  Allergies:     No Known Allergies   Physical Exam:     /64   Pulse 88   Temp (!) 97 °F (36 1 °C)   Resp 16   Ht 5' 9" (1 753 m)   Wt 77 kg (169 lb 12 8 oz)   SpO2 98%   BMI 25 08 kg/m²     Physical Exam  Constitutional:       General: She is not in acute distress  Appearance: She is well-developed  She is not ill-appearing, toxic-appearing or diaphoretic  HENT:      Head: Normocephalic and atraumatic  Eyes:      Conjunctiva/sclera: Conjunctivae normal    Cardiovascular:      Rate and Rhythm: Normal rate and regular rhythm  Heart sounds: Normal heart sounds  No murmur heard  Pulmonary:      Effort: Pulmonary effort is normal  No respiratory distress  Breath sounds: Normal breath sounds  No stridor  No wheezing or rales  Abdominal:      General: There is no distension  Palpations: Abdomen is soft  There is no mass  Tenderness: There is no abdominal tenderness  There is no guarding or rebound  Musculoskeletal:      Cervical back: Neck supple  Right lower leg: No edema  Left lower leg: No edema  Skin:     General: Skin is warm and dry  Neurological:      Mental Status: She is alert and oriented to person, place, and time  Psychiatric:         Mood and Affect: Mood normal          Behavior: Behavior normal          Thought Content:  Thought content normal          Judgment: Judgment normal       Comments: PHQ9=14          Candyce Lefort, MD   [facility]

## 2022-06-21 ENCOUNTER — OFFICE VISIT (OUTPATIENT)
Dept: PSYCHIATRY | Facility: CLINIC | Age: 27
End: 2022-06-21
Payer: COMMERCIAL

## 2022-06-21 DIAGNOSIS — F51.5 NIGHTMARES: Primary | ICD-10-CM

## 2022-06-21 DIAGNOSIS — F41.1 GAD (GENERALIZED ANXIETY DISORDER): ICD-10-CM

## 2022-06-21 DIAGNOSIS — F33.2 SEVERE EPISODE OF RECURRENT MAJOR DEPRESSIVE DISORDER, WITHOUT PSYCHOTIC FEATURES (HCC): ICD-10-CM

## 2022-06-21 PROCEDURE — 90792 PSYCH DIAG EVAL W/MED SRVCS: CPT | Performed by: NURSE PRACTITIONER

## 2022-06-21 RX ORDER — PRAZOSIN HYDROCHLORIDE 1 MG/1
1 CAPSULE ORAL
Qty: 30 CAPSULE | Refills: 2 | Status: SHIPPED | OUTPATIENT
Start: 2022-06-21 | End: 2022-08-05 | Stop reason: SDUPTHER

## 2022-06-21 NOTE — BH TREATMENT PLAN
TREATMENT PLAN (Medication Management Only)        [employer]    Name and Date of Birth:  Macario Hernandez 27 y o  1995  Date of Treatment Plan: June 21, 2022  Diagnosis/Diagnoses:    1  Nightmares    2  GISEL (generalized anxiety disorder)    3  Severe episode of recurrent major depressive disorder, without psychotic features [facility])      Strengths/Personal Resources for Self-Care: motivation for treatment, taking medications as prescribed, ability to communicate well, ability to listen, ability to reason  Area/Areas of need (in own words): depressive symptoms, anxiety symptoms  1  Long Term Goal: improve anxiety and maintain improvement in depression  Target Date: 6 months - 12/21/2022  Person/Persons responsible for completion of goal: Macario Hernandez  2  Short Term Objective (s) - How will we reach this goal?:   A  Provider new recommended medication/dosage changes and/or continue medication(s): continue current medications as prescribed  B   Attend medication management appointments regularly  C   Attend psychotherapy regularly  Target Date: 6 months - 12/21/2022  Person/Persons Responsible for Completion of Goal: Ro Lamb  Progress Towards Goals: continuing treatment  Treatment Modality: medication management with psychotherapy every 4 months  Review due 90 to 120 days from date of this plan: 6 months - 12/21/2022  Expected length of service: maintenance unless revised  My Physician/PA/NP and I have developed this plan together and I agree to work on the goals and objectives  I understand the treatment goals that were developed for my treatment
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
## 2022-06-28 ENCOUNTER — OFFICE VISIT (OUTPATIENT)
Dept: URGENT CARE | Age: 27
End: 2022-06-28
Payer: COMMERCIAL

## 2022-06-28 VITALS
DIASTOLIC BLOOD PRESSURE: 68 MMHG | OXYGEN SATURATION: 99 % | SYSTOLIC BLOOD PRESSURE: 116 MMHG | HEART RATE: 80 BPM | TEMPERATURE: 96.5 F | RESPIRATION RATE: 16 BRPM

## 2022-06-28 DIAGNOSIS — J02.0 STREP PHARYNGITIS: Primary | ICD-10-CM

## 2022-06-28 LAB — S PYO AG THROAT QL: POSITIVE

## 2022-06-28 PROCEDURE — 99213 OFFICE O/P EST LOW 20 MIN: CPT | Performed by: PHYSICIAN ASSISTANT

## 2022-06-28 PROCEDURE — 87880 STREP A ASSAY W/OPTIC: CPT | Performed by: PHYSICIAN ASSISTANT

## 2022-06-28 RX ORDER — AMOXICILLIN 500 MG/1
500 CAPSULE ORAL EVERY 12 HOURS SCHEDULED
Qty: 20 CAPSULE | Refills: 0 | Status: SHIPPED | OUTPATIENT
Start: 2022-06-28 | End: 2022-07-06 | Stop reason: SDUPTHER

## 2022-06-28 NOTE — PATIENT INSTRUCTIONS
Take antibiotics as prescribed  Make sure to take all the antibiotic even after you start feeling better  If you stop them too soon, you risk developing a resistant infection that is more difficult and expensive to treat  Never save antibiotics or take antibiotics without the recommendation of a healthcare provider or dental professional  Note that if you are taking birth control medications, antibiotics can decrease their effectiveness  Recommend abstinence or back up method while on antibiotics and for 3-5 days after completing the antibiotic course  You are considered contagious until you have been on the antibiotic for 24 hours  You should not share food or drinks with others and should not kiss on the mouth in that time  You should also stay home from work or school to avoid spreading the infection to others  You need to switch to a brand new, never used toothbrush after 48 hours (or 2 days on the antibiotic) to prevent giving strep back to yourself again  If symptoms are not improved after 2-3 days on the antibiotics, follow-up with your primary care provider  If symptoms worsen or new symptoms such as drooling, difficulty swallowing, voice changes, anterior neck pain, or jaw pain develop report to the emergency room as these are symptoms of an abscess having formed in your throat or neck  Strep Throat   AMBULATORY CARE:   Strep throat  is a throat infection caused by bacteria  It is easily spread from person to person  Common symptoms include the following:   Sore, red, and swollen throat    Fever and headache     Upset stomach, abdominal pain, or vomiting    White or yellow patches or blisters in the back of your throat    Tender, swollen lumps on the sides of your neck or jaw    Throat pain when you swallow    Call 911 for any of the following: You have trouble breathing  Seek care immediately if:   You have new symptoms like a bad headache, stiff neck, chest pain, or vomiting      You are drooling because you cannot swallow your spit  Contact your healthcare provider if:   You have a fever  You have a rash or ear pain  You have green, yellow-brown, or bloody mucus when you cough or blow your nose  You are unable to drink anything  You have questions or concerns about your condition or care  Treatment for strep throat  may include antibiotic medicine to treat your strep throat  You should feel better within 2 to 3 days after you start antibiotics  You may return to work or school 24 hours after you start antibiotics  Manage strep throat:   Use lozenges, ice, soft foods, or popsicles  to soothe your throat  Drink juice, milk shakes, or soup  if your throat is too sore to eat solid food  Drinking liquids can also help prevent dehydration  Gargle with salt water  Mix ¼ teaspoon salt in a glass of warm water and gargle  This may help reduce swelling in your throat  Do not smoke  Nicotine and other chemicals in cigarettes and cigars can cause lung damage and make your symptoms worse  Ask your healthcare provider for information if you currently smoke and need help to quit  E-cigarettes or smokeless tobacco still contain nicotine  Talk to your healthcare provider before you use these products  Prevent the spread of strep throat:   Wash your hands often  Use soap and water  Wash your hands after you use the bathroom, change a child's diapers, or sneeze  Wash your hands before you prepare or eat food  Do not share food or drinks  Replace your toothbrush after you have taken antibiotics for 24 hours  Follow up with your doctor as directed:  Write down your questions so you remember to ask them during your visits  © Copyright [employer] 2022 Information is for End User's use only and may not be sold, redistributed or otherwise used for commercial purposes   All illustrations and images included in CareNotes® are the copyrighted property of [employer]  or [address]  The above information is an  only  It is not intended as medical advice for individual conditions or treatments  Talk to your doctor, nurse or pharmacist before following any medical regimen to see if it is safe and effective for you

## 2022-06-28 NOTE — PROGRESS NOTES
[facility]        NAME: Dianne Salcedo is a 27 y o  female  : 1995    MRN: [id number]  DATE: 2022  TIME: 10:33 AM    Assessment and Plan   Strep pharyngitis [J02 0]  1  Strep pharyngitis  amoxicillin (AMOXIL) 500 mg capsule   Pt presents with complaints of sore throat and examination concerning for strep  Rapid strep in the clinic is positive  Pt will be started on Amoxicillin to treat as this is the treatment of choice for strep  Pt allergies were reviewed and they have no allergy to penicillins  Pt is instructed that they are considered contagious until they have been on antibiotics for 24 hours  They should not attend work or school during that time  The patient is provided with a note(s) to this effect  We discussed that after they have been on antibiotics 48 hours they need to throw away their current toothbrush and replace with a new toothbrush to prevent re-colonizing themselves with strep bacteria  It was discussed with the patient that I cannot rule out more serious conditions such as peritonsilar, tonsilar, and retropharyngeal abscess at this location that would require ED evaluation; however serious, these are rare conditions and unlikely based on my examination and I do not recommend ED evaluation at this time  The patient will follow-up with their PCP in 2-3 days if symptoms are not improved on antibiotics  They will report to the emergency room if symptoms worsen or new symptoms such as jaw pain, difficulty swallowing, anterior neck pain, or hoarseness of voice develop  Patient Instructions     Patient Instructions    Take antibiotics as prescribed  Make sure to take all the antibiotic even after you start feeling better  If you stop them too soon, you risk developing a resistant infection that is more difficult and expensive to treat   Never save antibiotics or take antibiotics without the recommendation of a healthcare provider or dental professional  Note that if you are taking birth control medications, antibiotics can decrease their effectiveness  Recommend abstinence or back up method while on antibiotics and for 3-5 days after completing the antibiotic course   You are considered contagious until you have been on the antibiotic for 24 hours  You should not share food or drinks with others and should not kiss on the mouth in that time  You should also stay home from work or school to avoid spreading the infection to others   You need to switch to a brand new, never used toothbrush after 48 hours (or 2 days on the antibiotic) to prevent giving strep back to yourself again   If symptoms are not improved after 2-3 days on the antibiotics, follow-up with your primary care provider   If symptoms worsen or new symptoms such as drooling, difficulty swallowing, voice changes, anterior neck pain, or jaw pain develop report to the emergency room as these are symptoms of an abscess having formed in your throat or neck  Strep Throat   AMBULATORY CARE:   Strep throat  is a throat infection caused by bacteria  It is easily spread from person to person  Common symptoms include the following:   · Sore, red, and swollen throat    · Fever and headache     · Upset stomach, abdominal pain, or vomiting    · White or yellow patches or blisters in the back of your throat    · Tender, swollen lumps on the sides of your neck or jaw    · Throat pain when you swallow    Call 911 for any of the following:   · You have trouble breathing  Seek care immediately if:   · You have new symptoms like a bad headache, stiff neck, chest pain, or vomiting  · You are drooling because you cannot swallow your spit  Contact your healthcare provider if:   · You have a fever  · You have a rash or ear pain  · You have green, yellow-brown, or bloody mucus when you cough or blow your nose  · You are unable to drink anything      · You have questions or concerns about your condition or care  Treatment for strep throat  may include antibiotic medicine to treat your strep throat  You should feel better within 2 to 3 days after you start antibiotics  You may return to work or school 24 hours after you start antibiotics  Manage strep throat:   · Use lozenges, ice, soft foods, or popsicles  to soothe your throat  · Drink juice, milk shakes, or soup  if your throat is too sore to eat solid food  Drinking liquids can also help prevent dehydration  · Gargle with salt water  Mix ¼ teaspoon salt in a glass of warm water and gargle  This may help reduce swelling in your throat  · Do not smoke  Nicotine and other chemicals in cigarettes and cigars can cause lung damage and make your symptoms worse  Ask your healthcare provider for information if you currently smoke and need help to quit  E-cigarettes or smokeless tobacco still contain nicotine  Talk to your healthcare provider before you use these products  Prevent the spread of strep throat:   · Wash your hands often  Use soap and water  Wash your hands after you use the bathroom, change a child's diapers, or sneeze  Wash your hands before you prepare or eat food  · Do not share food or drinks  Replace your toothbrush after you have taken antibiotics for 24 hours  Follow up with your doctor as directed:  Write down your questions so you remember to ask them during your visits  © Copyright [employer] 2022 Information is for End User's use only and may not be sold, redistributed or otherwise used for commercial purposes  All illustrations and images included in CareNotes® are the copyrighted property of A D A M , Inc  or [facility] Ovidio Matamoros   The above information is an  only  It is not intended as medical advice for individual conditions or treatments  Talk to your doctor, nurse or pharmacist before following any medical regimen to see if it is safe and effective for you         Follow up with PCP in 3-5 days   Proceed to  ER if symptoms worsen  Chief Complaint     Chief Complaint   Patient presents with    Cold Like Symptoms     Cold symptoms x1 week ago  Had body aches, feverish  Sore throat had gotten progressively worse  At home Covid test negative  History of Present Illness       18-year-old female presents with complaint of runny nose, congestion, slight cough, and sore throat for approximately 1 week  Patient reports that the sore throat symptoms have been gradually worsening  Patient reports she has felt feverish and had chills but has not actually had a fever  She denies difficulty swallowing, voice changes, nausea, vomiting, diarrhea, loss of taste, loss of smell  She had negative COVID test at home  No other concerns or complaints today  Sore Throat   This is a new problem  The current episode started in the past 7 days  The problem has been gradually worsening  Neither side of throat is experiencing more pain than the other  There has been no fever  The pain is at a severity of 4/10  The pain is moderate  Associated symptoms include congestion, coughing and swollen glands  Pertinent negatives include no shortness of breath, stridor or trouble swallowing  She has had no exposure to strep or mono  She has tried cool liquids for the symptoms  The treatment provided no relief  Review of Systems   Review of Systems   Constitutional: Positive for chills  Negative for fatigue and fever  HENT: Positive for congestion and sore throat  Negative for trouble swallowing and voice change  Respiratory: Positive for cough  Negative for shortness of breath and stridor            Current Medications       Current Outpatient Medications:     amoxicillin (AMOXIL) 500 mg capsule, Take 1 capsule (500 mg total) by mouth every 12 (twelve) hours for 10 days, Disp: 20 capsule, Rfl: 0    buPROPion (WELLBUTRIN XL) 300 mg 24 hr tablet, Take 1 tablet (300 mg total) by mouth in the morning , Disp: 90 tablet, Rfl: 0    escitalopram (LEXAPRO) 20 mg tablet, TAKE 1 TABLET BY MOUTH EVERY DAY, Disp: 90 tablet, Rfl: 0    prazosin (MINIPRESS) 1 mg capsule, Take 1 capsule (1 mg total) by mouth daily at bedtime, Disp: 30 capsule, Rfl: 2    Tranexamic Acid 650 MG TABS, Take 2 tablets (1,300 mg total) by mouth every 8 (eight) hours as needed (for heavy bleeding up to 5 days of each menstrual cycle), Disp: 90 tablet, Rfl: 0    Current Allergies     Allergies as of 06/28/2022    (No Known Allergies)            The following portions of the patient's history were reviewed and updated as appropriate: allergies, current medications, past family history, past medical history, past social history, past surgical history and problem list      Past Medical History:   Diagnosis Date    Anxiety     Depression     Depression, recurrent ([facility] [address] ) 3/2/2022    Lyme disease     as a child     Vaccine for human papilloma virus (HPV) types 6, 11, 12, and 18 administered 2020    Varicella vaccination        Past Surgical History:   Procedure Laterality Date    NO PAST SURGERIES      WISDOM TOOTH EXTRACTION  06/22/2021       Family History   Problem Relation Age of Onset    Hashimoto's thyroiditis Mother     Hypothyroidism Mother     No Known Problems Father     Diabetes Maternal Grandfather     Diabetes type I Sister     No Known Problems Brother     Dementia Maternal Grandmother     Dementia Paternal Grandmother     No Known Problems Paternal Grandfather     OCD Sister     No Known Problems Sister     No Known Problems Sister     No Known Problems Brother     No Known Problems Brother     No Known Problems Brother     Breast cancer Neg Hx     Ovarian cancer Neg Hx     Colon cancer Neg Hx          Medications have been verified  Objective   /68   Pulse 80   Temp (!) 96 5 °F (35 8 °C)   Resp 16   LMP 06/18/2022   SpO2 99%   Patient's last menstrual period was 06/18/2022         Physical Exam Physical Exam  Vitals and nursing note reviewed  Constitutional:       General: She is not in acute distress  Appearance: Normal appearance  She is not ill-appearing or toxic-appearing  HENT:      Head: Normocephalic and atraumatic  Jaw: No trismus  Right Ear: Tympanic membrane, ear canal and external ear normal  There is no impacted cerumen  No foreign body  Left Ear: Tympanic membrane, ear canal and external ear normal  There is no impacted cerumen  No foreign body  Nose: No nasal deformity, mucosal edema, congestion or rhinorrhea  Right Nostril: No foreign body, epistaxis or occlusion  Left Nostril: No foreign body, epistaxis or occlusion  Right Turbinates: Not enlarged, swollen or pale  Left Turbinates: Not enlarged, swollen or pale  Mouth/Throat:      Lips: Pink  No lesions  Mouth: Mucous membranes are moist  No injury, oral lesions or angioedema  Dentition: Normal dentition  Tongue: No lesions  Tongue does not deviate from midline  Palate: No mass and lesions  Pharynx: Uvula midline  Pharyngeal swelling and posterior oropharyngeal erythema present  No oropharyngeal exudate or uvula swelling  Tonsils: No tonsillar exudate or tonsillar abscesses  2+ on the right  2+ on the left  Eyes:      General: Lids are normal  Gaze aligned appropriately  No allergic shiner  Extraocular Movements: Extraocular movements intact  Cardiovascular:      Rate and Rhythm: Normal rate  Pulmonary:      Effort: Pulmonary effort is normal       Comments: Patient speaking in full sentences with no increased respiratory effort  No audible wheezing or stridor  Lymphadenopathy:      Cervical: Cervical adenopathy present  Right cervical: Superficial cervical adenopathy present  Left cervical: Superficial cervical adenopathy present  Skin:     General: Skin is warm and dry     Neurological:      Mental Status: She is alert and oriented to person, place, and time  Coordination: Coordination is intact  Gait: Gait is intact  Psychiatric:         Attention and Perception: Attention and perception normal          Mood and Affect: Mood and affect normal          Speech: Speech normal          Behavior: Behavior is cooperative  Note: Portions of this record may have been created with voice recognition software  Occasional wrong word or "sound a like" substitutions may have occurred due to the inherent limitations of voice recognition software  Please read the chart carefully and recognize, using context, where substitutions have occurred  *

## 2022-07-01 ENCOUNTER — OFFICE VISIT (OUTPATIENT)
Dept: OBGYN CLINIC | Facility: CLINIC | Age: 27
End: 2022-07-01
Payer: COMMERCIAL

## 2022-07-01 VITALS
WEIGHT: 171.2 LBS | SYSTOLIC BLOOD PRESSURE: 110 MMHG | HEIGHT: 69 IN | BODY MASS INDEX: 25.36 KG/M2 | DIASTOLIC BLOOD PRESSURE: 70 MMHG

## 2022-07-01 DIAGNOSIS — Z01.419 ENCOUNTER FOR ANNUAL ROUTINE GYNECOLOGICAL EXAMINATION: Primary | ICD-10-CM

## 2022-07-01 DIAGNOSIS — N92.0 MENORRHAGIA WITH REGULAR CYCLE: ICD-10-CM

## 2022-07-01 PROBLEM — R10.2 PELVIC PAIN: Status: RESOLVED | Noted: 2020-07-30 | Resolved: 2022-07-01

## 2022-07-01 PROBLEM — N39.0 RECURRENT UTI: Status: RESOLVED | Noted: 2020-07-30 | Resolved: 2022-07-01

## 2022-07-01 PROCEDURE — S0612 ANNUAL GYNECOLOGICAL EXAMINA: HCPCS | Performed by: OBSTETRICS & GYNECOLOGY

## 2022-07-01 RX ORDER — TRANEXAMIC ACID 650 MG/1
1300 TABLET ORAL EVERY 8 HOURS PRN
Qty: 90 TABLET | Refills: 3 | Status: SHIPPED | OUTPATIENT
Start: 2022-07-01

## 2022-07-01 NOTE — PROGRESS NOTES
Pt is a 27 y o  Bernice Kumar with Patient's last menstrual period was 2022  using condoms (male) for [facility] presents for preventive care  She notes the same partner since her last STI evaluation  In her lifetime she has been involved with 1 partner   Safe sexual practices (monogomy, condoms) are followed consistently  · She does  feel safe in the relationship  She does feel safe in her home  · Her calcium intake encompasses oj with foritified with calcium, multivitamin 3x/week and milk (cow, goat, almond, cashew, soy, etc) for a total of 6-7 servings daily on average  She does take additional Vitamin D (MVI or supplement)  · She exercises 3-4 times per week  · Her menses occur every 28 Days, last 4-5 days and require regular pad every 3-6 hours  Menstrual History:  OB History        0    Para   0    Term   0       0    AB   0    Living   0       SAB   0    IAB   0    Ectopic   0    Multiple   0    Live Births   0           Obstetric Comments   Menarche: 10    Menses: every 28 days, 5 days, first 2 days one reg pad every 2-3 hrs, one pad every 5-6 hours the other days            Menarche age: 13  Patient's last menstrual period was 2022  ·      · She has completed the HPV vaccine series appropriate for age    · tobacco use : does not use tobacco              · Colonoscopy/mammogram: not indicated  · Pap: 2020-wnl, repeat     Past Medical History:   Diagnosis Date    Anxiety     Depression     Depression, recurrent ([facility] [address] ) 2022    Lyme disease     as a child     Pap smear for cervical cancer screening     2020-wnl    Pelvic pain 2020    Recurrent UTI 2020    Vaccine for human papilloma virus (HPV) types 6, 11, 16, and 18 administered     Varicella vaccination        Past Surgical History:   Procedure Laterality Date    WISDOM TOOTH EXTRACTION  2021       OB History    Para Term  AB Living   0 0 0 0 0 0   SAB IAB Ectopic Multiple Live Births   0 0 0 0 0   Obstetric Comments   Menarche: 10      Menses: every 28 days, 5 days, first 2 days one reg pad every 2-3 hrs, one pad every 5-6 hours the other days            Current Outpatient Medications:     amoxicillin (AMOXIL) 500 mg capsule, Take 1 capsule (500 mg total) by mouth every 12 (twelve) hours for 10 days, Disp: 20 capsule, Rfl: 0    buPROPion (WELLBUTRIN XL) 300 mg 24 hr tablet, Take 1 tablet (300 mg total) by mouth in the morning , Disp: 90 tablet, Rfl: 0    escitalopram (LEXAPRO) 20 mg tablet, TAKE 1 TABLET BY MOUTH EVERY DAY, Disp: 90 tablet, Rfl: 0    prazosin (MINIPRESS) 1 mg capsule, Take 1 capsule (1 mg total) by mouth daily at bedtime, Disp: 30 capsule, Rfl: 2    Tranexamic Acid 650 MG TABS, Take 2 tablets (1,300 mg total) by mouth every 8 (eight) hours as needed (for heavy bleeding up to 5 days of each menstrual cycle), Disp: 90 tablet, Rfl: 3    No Known Allergies    Social History     Socioeconomic History    Marital status: Single     Spouse name: None    Number of children: 0    Years of education: None    Highest education level: Bachelor's degree (e g , BA, AB, BS)   Occupational History    Occupation: teacher     Comment: reading, pa   Tobacco Use    Smoking status: Never Smoker    Smokeless tobacco: Never Used   Vaping Use    Vaping Use: Never used   Substance and Sexual Activity    Alcohol use: Yes     Alcohol/week: 8 0 standard drinks     Types: 4 Glasses of wine, 4 Cans of beer per week    Drug use: Never    Sexual activity: Yes     Partners: Male     Birth control/protection: Condom Male     Comment: lifetime partners: 1   Other Topics Concern    None   Social History Narrative    Yazidi preferences: none    Accepts blood products        Exercise: 3-4 a week via yoga ~30 min    Calcium: 2 cup of almond milk daily   Multivitamin 3x/week, oj with ca++ 1 daily     Social Determinants of Health     Financial Resource Strain: Not on file Food Insecurity: Not on file   Transportation Needs: Not on file   Physical Activity: Not on file   Stress: Not on file   Social Connections: Not on file   Intimate Partner Violence: Not on file   Housing Stability: Not on file       Family History   Problem Relation Age of Onset    Hashimoto's thyroiditis Mother     Hypothyroidism Mother     No Known Problems Father     Diabetes Maternal Grandfather     Diabetes type I Sister     No Known Problems Brother     Dementia Maternal Grandmother     Dementia Paternal Grandmother     No Known Problems Paternal Grandfather     OCD Sister     No Known Problems Sister     No Known Problems Sister     No Known Problems Brother     No Known Problems Brother     No Known Problems Brother     Breast cancer Neg Hx     Ovarian cancer Neg Hx     Colon cancer Neg Hx        Blood pressure 110/70, height 5' 9" (1 753 m), weight 77 7 kg (171 lb 3 2 oz), last menstrual period 06/18/2022  and Body mass index is 25 28 kg/m²  Physical Exam  Constitutional:       General: She is not in acute distress  Appearance: Normal appearance  She is well-developed and normal weight  She is not ill-appearing  HENT:      Head: Normocephalic and atraumatic  Eyes:      Conjunctiva/sclera: Conjunctivae normal    Neck:      Thyroid: No thyromegaly  Trachea: No tracheal deviation  Cardiovascular:      Rate and Rhythm: Normal rate and regular rhythm  Heart sounds: Normal heart sounds  Pulmonary:      Effort: Pulmonary effort is normal  No respiratory distress  Breath sounds: Normal breath sounds  No stridor  No wheezing or rales  Abdominal:      General: Bowel sounds are normal  There is no distension  Palpations: Abdomen is soft  There is no mass  Tenderness: There is no abdominal tenderness  There is no guarding or rebound  Hernia: No hernia is present  Musculoskeletal:         General: No tenderness  Normal range of motion        Cervical back: Normal range of motion and neck supple  Lymphadenopathy:      Cervical: No cervical adenopathy  Skin:     General: Skin is warm  Findings: No erythema or rash  Neurological:      Mental Status: She is alert and oriented to person, place, and time  Psychiatric:         Mood and Affect: Mood normal          Behavior: Behavior normal          Thought Content: Thought content normal          Judgment: Judgment normal          Breasts: breasts appear normal, no suspicious masses, no skin or nipple changes or axillary nodes, symmetric fibrous changes in both upper outer quadrants      vulva: normal external genitalia for age and no lesions, masses, epithelial changes, or exudate  vagina: color pink and rugae  well formed rugae  cervix: nullip and no lesions   uterus: NSSC, AF, NT, mobile  adnexa: no masses or tenderness      A/P:  Pt is a 27 y o  Rexine Child with      Ro was seen today for gynecologic exam     Diagnoses and all orders for this visit:    Encounter for annual routine gynecological examination  -pap up to date  -normal examination  F/u 1 year  Menorrhagia with regular cycle  -     Tranexamic Acid 650 MG TABS; Take 2 tablets (1,300 mg total) by mouth every 8 (eight) hours as needed (for heavy bleeding up to 5 days of each menstrual cycle)

## 2022-07-06 DIAGNOSIS — J02.0 STREP PHARYNGITIS: ICD-10-CM

## 2022-07-06 RX ORDER — AMOXICILLIN 500 MG/1
500 CAPSULE ORAL EVERY 8 HOURS SCHEDULED
Qty: 30 CAPSULE | Refills: 0 | Status: SHIPPED | OUTPATIENT
Start: 2022-07-06 | End: 2022-07-16

## 2022-07-18 DIAGNOSIS — F41.1 GAD (GENERALIZED ANXIETY DISORDER): ICD-10-CM

## 2022-07-18 DIAGNOSIS — F33.1 MODERATE EPISODE OF RECURRENT MAJOR DEPRESSIVE DISORDER (HCC): ICD-10-CM

## 2022-07-18 RX ORDER — ESCITALOPRAM OXALATE 20 MG/1
TABLET ORAL
Qty: 90 TABLET | Refills: 0 | Status: SHIPPED | OUTPATIENT
Start: 2022-07-18 | End: 2022-08-05 | Stop reason: SDUPTHER

## 2022-07-24 ENCOUNTER — OFFICE VISIT (OUTPATIENT)
Dept: URGENT CARE | Facility: CLINIC | Age: 27
End: 2022-07-24
Payer: COMMERCIAL

## 2022-07-24 VITALS
RESPIRATION RATE: 16 BRPM | WEIGHT: 165 LBS | BODY MASS INDEX: 24.44 KG/M2 | DIASTOLIC BLOOD PRESSURE: 28 MMHG | SYSTOLIC BLOOD PRESSURE: 122 MMHG | TEMPERATURE: 98.5 F | HEART RATE: 60 BPM | OXYGEN SATURATION: 100 % | HEIGHT: 69 IN

## 2022-07-24 DIAGNOSIS — J02.9 SORE THROAT: ICD-10-CM

## 2022-07-24 DIAGNOSIS — J02.9 SORE THROAT: Primary | ICD-10-CM

## 2022-07-24 PROCEDURE — 99213 OFFICE O/P EST LOW 20 MIN: CPT | Performed by: NURSE PRACTITIONER

## 2022-07-24 PROCEDURE — 87070 CULTURE OTHR SPECIMN AEROBIC: CPT | Performed by: NURSE PRACTITIONER

## 2022-07-24 NOTE — PATIENT INSTRUCTIONS
--Will send full throat culture, calling if results are positive (anticipate 48-72 hours)  --For sore throat, you can take OTC lozenges, use warm gargles (salt water or apple cider vinegar and honey), herbal teas, or an OTC throat spray (Chloraseptic)  --OTC Zyrtec/Claritin/Allegra for ongoing post nasal drip  --Tylenol or Motrin as needed  --You can take Tylenol or Motrin/Advil as needed for fever, headache, body aches  Motrin/Advil should be avoided, however, if you have a history of heart disease, bleeding ulcers, or if you take blood thinners  --You should contact your primary care provider and/or go to the ER if your symptoms are not improved or get worse over the next 3-5 days

## 2022-07-24 NOTE — PROGRESS NOTES
[facility]        NAME: Rodolfo Lucia is a 27 y o  female  : 1995    MRN: [id number]  DATE: 2022  TIME: 10:17 AM    Assessment and Plan   Sore throat [J02 9]  1  Sore throat  Throat culture     --Suspect mild allergies, post nasal drip as the most likely cause of her symptoms  Strep highly unlikely at this time, as she has been on close to 3 weeks of antibiotic  Will nevertheless, send throat culture as precaution  Mono unlikely given fairly benign appearance of throat, lack of fatigue, lack of adenopathy, fevers    Patient Instructions     --Will send full throat culture, calling if results are positive (anticipate 48-72 hours)  --For sore throat, you can take OTC lozenges, use warm gargles (salt water or apple cider vinegar and honey), herbal teas, or an OTC throat spray (Chloraseptic)  --OTC Zyrtec/Claritin/Allegra for ongoing post nasal drip  --Tylenol or Motrin as needed  --You can take Tylenol or Motrin/Advil as needed for fever, headache, body aches  Motrin/Advil should be avoided, however, if you have a history of heart disease, bleeding ulcers, or if you take blood thinners  --You should contact your primary care provider and/or go to the ER if your symptoms are not improved or get worse over the next 3-5 days  Chief Complaint     Chief Complaint   Patient presents with    Sore Throat     Pt took 20 days of amoxicillin for sore throat  History of Present Illness       Here with complaints of intermittent sore throat, post nasal drip, mild cough, ear pressure x 1 month  No associated fever/chills, fatigue, nasal congestion, rhinorrhea, headache, body aches, dyspnea  Seen at urgent care on  for this  Rapid strep test positive  No culture results sent  Prescribed amoxicillin x 10 days, which she completed  Symptoms improved somewhat, then came back  Called her PCP, who prescribed another 10 days of amoxicillin, which she completed    Again, symptoms improved somewhat but not fully resolved  Changed toothbrush  No known infectious contacts  Denies acid reflux/heartburn  No prior mono  Sore Throat   This is a recurrent problem  The current episode started 1 to 4 weeks ago  The problem has been waxing and waning  The pain is worse on the left side  There has been no fever  The fever has been present for less than 1 day  The pain is at a severity of 6/10  Associated symptoms include coughing, headaches, a hoarse voice, a plugged ear sensation, neck pain and swollen glands  Pertinent negatives include no abdominal pain, congestion, diarrhea, drooling, ear discharge, ear pain, shortness of breath, stridor, trouble swallowing or vomiting  She has had exposure to strep  She has had no exposure to mono  Review of Systems   Review of Systems   Constitutional: Negative for fever  HENT: Positive for hoarse voice, postnasal drip and sore throat  Negative for congestion, drooling, ear discharge, ear pain and trouble swallowing  Respiratory: Positive for cough  Negative for shortness of breath and stridor  Gastrointestinal: Negative for abdominal pain, diarrhea and vomiting  Musculoskeletal: Positive for neck pain  Neurological: Positive for headaches           Current Medications       Current Outpatient Medications:     buPROPion (WELLBUTRIN XL) 300 mg 24 hr tablet, Take 1 tablet (300 mg total) by mouth in the morning , Disp: 90 tablet, Rfl: 0    escitalopram (LEXAPRO) 20 mg tablet, TAKE 1 TABLET BY MOUTH EVERY DAY, Disp: 90 tablet, Rfl: 0    prazosin (MINIPRESS) 1 mg capsule, Take 1 capsule (1 mg total) by mouth daily at bedtime, Disp: 30 capsule, Rfl: 2    Tranexamic Acid 650 MG TABS, Take 2 tablets (1,300 mg total) by mouth every 8 (eight) hours as needed (for heavy bleeding up to 5 days of each menstrual cycle), Disp: 90 tablet, Rfl: 3    Current Allergies     Allergies as of 07/24/2022    (No Known Allergies)            The following portions of the patient's history were reviewed and updated as appropriate: allergies, current medications, past family history, past medical history, past social history, past surgical history and problem list      Past Medical History:   Diagnosis Date    Anxiety     Depression     Depression, recurrent ([facility] [address] ) 03/02/2022    Lyme disease     as a child     Pap smear for cervical cancer screening     2/2020-wnl    Pelvic pain 07/30/2020    Recurrent UTI 07/30/2020    Vaccine for human papilloma virus (HPV) types 6, 6, 12, and 18 administered 2020    Varicella vaccination        Past Surgical History:   Procedure Laterality Date    WISDOM TOOTH EXTRACTION  06/22/2021       Family History   Problem Relation Age of Onset    Hashimoto's thyroiditis Mother     Hypothyroidism Mother     No Known Problems Father     Diabetes Maternal Grandfather     Diabetes type I Sister     No Known Problems Brother     Dementia Maternal Grandmother     Dementia Paternal Grandmother     No Known Problems Paternal Grandfather     OCD Sister     No Known Problems Sister     No Known Problems Sister     No Known Problems Brother     No Known Problems Brother     No Known Problems Brother     Breast cancer Neg Hx     Ovarian cancer Neg Hx     Colon cancer Neg Hx          Medications have been verified  Objective   BP (!) 122/28   Pulse 60   Temp 98 5 °F (36 9 °C)   Resp 16   Ht 5' 9" (1 753 m)   Wt 74 8 kg (165 lb)   SpO2 100%   BMI 24 37 kg/m²   No LMP recorded  Physical Exam     Physical Exam  Constitutional:       General: She is not in acute distress  Appearance: Normal appearance  She is well-developed  She is not ill-appearing, toxic-appearing or diaphoretic  HENT:      Head: Normocephalic  Right Ear: Tympanic membrane, ear canal and external ear normal       Left Ear: Tympanic membrane, ear canal and external ear normal       Nose: Congestion and rhinorrhea present  Mouth/Throat:      Mouth: Mucous membranes are moist       Pharynx: Posterior oropharyngeal erythema present  No pharyngeal swelling or oropharyngeal exudate  Tonsils: No tonsillar exudate or tonsillar abscesses  1+ on the right  1+ on the left  Comments: Non-enlarged tonsils, posterior OP mildly erythematous without exudate  No PTA noted  Eyes:      General:         Right eye: No discharge  Left eye: No discharge  Cardiovascular:      Rate and Rhythm: Normal rate and regular rhythm  Heart sounds: Normal heart sounds  No murmur heard  Pulmonary:      Effort: Pulmonary effort is normal  No respiratory distress  Breath sounds: Normal breath sounds  No stridor  No wheezing, rhonchi or rales  Chest:      Chest wall: No tenderness  Abdominal:      Tenderness: There is no abdominal tenderness  Musculoskeletal:      Cervical back: Neck supple  Lymphadenopathy:      Cervical: No cervical adenopathy  Skin:     General: Skin is warm and dry  Neurological:      Mental Status: She is alert and oriented to person, place, and time  Deep Tendon Reflexes: Reflexes are normal and symmetric     Psychiatric:         Mood and Affect: Mood normal

## 2022-07-26 LAB — BACTERIA THROAT CULT: NORMAL

## 2022-08-05 ENCOUNTER — OFFICE VISIT (OUTPATIENT)
Dept: PSYCHIATRY | Facility: CLINIC | Age: 27
End: 2022-08-05
Payer: COMMERCIAL

## 2022-08-05 DIAGNOSIS — F51.5 NIGHTMARES: ICD-10-CM

## 2022-08-05 DIAGNOSIS — F33.1 MODERATE EPISODE OF RECURRENT MAJOR DEPRESSIVE DISORDER (HCC): ICD-10-CM

## 2022-08-05 DIAGNOSIS — F33.2 SEVERE EPISODE OF RECURRENT MAJOR DEPRESSIVE DISORDER, WITHOUT PSYCHOTIC FEATURES (HCC): Primary | ICD-10-CM

## 2022-08-05 DIAGNOSIS — F41.1 GAD (GENERALIZED ANXIETY DISORDER): ICD-10-CM

## 2022-08-05 PROCEDURE — 99214 OFFICE O/P EST MOD 30 MIN: CPT | Performed by: NURSE PRACTITIONER

## 2022-08-05 RX ORDER — BUPROPION HYDROCHLORIDE 300 MG/1
300 TABLET ORAL DAILY
Qty: 90 TABLET | Refills: 1 | Status: SHIPPED | OUTPATIENT
Start: 2022-08-05 | End: 2023-02-01

## 2022-08-05 RX ORDER — ESCITALOPRAM OXALATE 20 MG/1
20 TABLET ORAL DAILY
Qty: 90 TABLET | Refills: 1 | Status: SHIPPED | OUTPATIENT
Start: 2022-08-05 | End: 2022-10-18 | Stop reason: SDUPTHER

## 2022-08-05 RX ORDER — PRAZOSIN HYDROCHLORIDE 1 MG/1
1 CAPSULE ORAL
Qty: 30 CAPSULE | Refills: 2 | Status: SHIPPED | OUTPATIENT
Start: 2022-08-05 | End: 2022-09-19 | Stop reason: SDUPTHER

## 2022-08-05 NOTE — PSYCH
This note was not shared with the patient due to reasonable likelihood of causing patient harm    MEDICATION MANAGEMENT NOTE        [address] [place] [address]    Name and Date of Birth:  Antwon Dnun 27 y o  1995 MRN: [id number]    Date of Visit: August 5, 2022    No Known Allergies  SUBJECTIVE:    Majo Patton is seen today for a follow up for Major Depressive Disorder, Generalized Anxiety Disorder and nightmares  She reports that she has done fairly well since the last visit  Patient reports that she has been traveling frequently over the past several weeks  Has experienced some instances feelings depression and anxiety at times but has overall remained stable and is functioning well  Has enjoyed her time on vacation  Some difficulty maintaining consistency with prazosin but has remained consistent with Lexapro Wellbutrin  Has noticed a few episodes dizziness or lightheadedness since starting prazosin  Worst episode of dizziness occurred when the patient had not eaten that day  Discussed plan to set an alarm in order to improve compliance with prazosin in emphasized importance of remaining hydrated and eating regular meals  Patient is in agreement with maintaining current medication regimen  Will contact office if episodes of dizziness persist or worsen  Patient has been working with individual therapist to implement CBT and DBT practices  Patient is particularly liked the skilled "gathering evidence" to support thoughts or conclusions  PLAN:  Continue prazosin 1 mg PO HS  Continue Wellbutrin  mg p o  Daily  Continue Lexapro 20 mg p o  Daily  Continue individual therapy;   Aware of 24 hour and weekend coverage for urgent situations accessed by calling [facility] main practice number    Diagnoses and all orders for this visit:    Severe episode of recurrent major depressive disorder, without psychotic features ([facility] [address] )    GISEL (generalized anxiety disorder)    Nightmares        Current Outpatient Medications on File Prior to Visit   Medication Sig Dispense Refill    buPROPion (WELLBUTRIN XL) 300 mg 24 hr tablet Take 1 tablet (300 mg total) by mouth in the morning  90 tablet 0    escitalopram (LEXAPRO) 20 mg tablet TAKE 1 TABLET BY MOUTH EVERY DAY 90 tablet 0    prazosin (MINIPRESS) 1 mg capsule Take 1 capsule (1 mg total) by mouth daily at bedtime 30 capsule 2    Tranexamic Acid 650 MG TABS Take 2 tablets (1,300 mg total) by mouth every 8 (eight) hours as needed (for heavy bleeding up to 5 days of each menstrual cycle) 90 tablet 3     No current facility-administered medications on file prior to visit  Psychotherapy Provided:     Individual psychotherapy provided: Yes  Counseling was provided during the session today for 16 minutes  Supportive counseling provided  HPI ROS Appetite Changes and Sleep:     She reports vivid dreams, adequate appetite, normal energy level   Denies homicidal ideation, denies suicidal ideation    Review Of Systems:      General emotional problems   Personality no change in personality   Constitutional negative   ENT negative   Cardiovascular negative   Respiratory negative   Gastrointestinal negative   Genitourinary negative   Musculoskeletal negative   Integumentary negative   Neurological negative   Endocrine negative   Other Symptoms none, all other systems are negative     Mental Status Evaluation:    Appearance Adequate hygiene and grooming   Behavior calm and cooperative   Mood euthymic  Depression Scale -  of 10 (0 = No depression)  Anxiety Scale -  of 10 (0 = No anxiety)   Speech talkative and Normal rate and volume   Affect appropriate and mood-congruent   Thought Processes Goal directed and coherent   Thought Content Does not verbalize delusional material   Associations Tightly connected   Perceptual Disturbances Denies hallucinations and does not appear to be responding to internal stimuli   Risk Potential Suicidal/Homicidal Ideation - No evidence of suicidal or homicidal ideation and patient does not verbalize suicidal or homicidal ideation  Risk of Violence - No evidence of risk for violence found on assessment  Risk of Self Mutilation - No evidence of risk for self mutilation found on assessment   Orientation oriented to person, place, time/date and situation   Memory recent and remote memory grossly intact   Consciousness alert and awake   Attention/Concentration attention span and concentration are age appropriate   Insight fair and improved   Judgement fair and improved   Muscle Strength and Gait normal muscle strength and normal muscle tone, normal gait/station and normal balance   Motor Activity no abnormal movements   Language no difficulty naming common objects, no difficulty repeating a phrase, no difficulty writing a sentence   Fund of Knowledge adequate knowledge of current events  adequate fund of knowledge regarding past history  adequate fund of knowledge regarding vocabulary      Past Psychiatric History Update:     Inpatient Psychiatric Admission Since Last Encounter:   no  Changes to Outpatient Psychiatric Treatment Team:    no  Suicide Attempt Or Self Mutilation Since Last Encounter:   no  Incidence of Violent Behavior Since Last Encounter:   no    Traumatic History Update:     New Onset of Abuse Since Last Encounter:   no  Traumatic Events Since Last Encounter:   no    Past Medical History:    Past Medical History:   Diagnosis Date    Anxiety     Depression     Depression, recurrent ([facility] [address] ) 03/02/2022    Lyme disease     as a child     Pap smear for cervical cancer screening     2/2020-wnl    Pelvic pain 07/30/2020    Recurrent UTI 07/30/2020    Vaccine for human papilloma virus (HPV) types 6, 11, 16, and 18 administered 2020    Varicella vaccination         Past Surgical History:   Procedure Laterality Date    WISDOM TOOTH EXTRACTION  06/22/2021     No Known Allergies  Substance Abuse History:    Social History     Substance and Sexual Activity   Alcohol Use Yes    Alcohol/week: 8 0 standard drinks    Types: 4 Glasses of wine, 4 Cans of beer per week     Social History     Substance and Sexual Activity   Drug Use Never     Social History:    Social History     Socioeconomic History    Marital status: Single     Spouse name: Not on file    Number of children: 0    Years of education: Not on file    Highest education level: Bachelor's degree (e g , BA, AB, BS)   Occupational History    Occupation: teacher     Comment: reading, pa   Tobacco Use    Smoking status: Never Smoker    Smokeless tobacco: Never Used   Vaping Use    Vaping Use: Never used   Substance and Sexual Activity    Alcohol use: Yes     Alcohol/week: 8 0 standard drinks     Types: 4 Glasses of wine, 4 Cans of beer per week    Drug use: Never    Sexual activity: Yes     Partners: Male     Birth control/protection: Condom Male     Comment: lifetime partners: 1   Other Topics Concern    Not on file   Social History Narrative    Mormonism preferences: none    Accepts blood products        Exercise: 3-4 a week via yoga ~30 min    Calcium: 2 cup of almond milk daily   Multivitamin 3x/week, oj with ca++ 1 daily     Social Determinants of Health     Financial Resource Strain: Not on file   Food Insecurity: Not on file   Transportation Needs: Not on file   Physical Activity: Not on file   Stress: Not on file   Social Connections: Not on file   Intimate Partner Violence: Not on file   Housing Stability: Not on file     Family Psychiatric History:     Family History   Problem Relation Age of Onset    Hashimoto's thyroiditis Mother     Hypothyroidism Mother     No Known Problems Father     Diabetes Maternal Grandfather     Diabetes type I Sister     No Known Problems Brother     Dementia Maternal Grandmother     Dementia Paternal Grandmother     No Known Problems Paternal Grandfather     OCD Sister     No Known Problems Sister     No Known Problems Sister     No Known Problems Brother     No Known Problems Brother     No Known Problems Brother     Breast cancer Neg Hx     Ovarian cancer Neg Hx     Colon cancer Neg Hx      History Review: The following portions of the patient's history were reviewed and updated as appropriate: allergies, current medications, past family history, past medical history, past social history, past surgical history and problem list     OBJECTIVE:     Vital signs in last 24 hours: There were no vitals filed for this visit  Laboratory Results: I have personally reviewed all pertinent laboratory/tests results  Suicide/Homicide Risk Assessment:    Risk of Harm to Self:   The following ratings are based on assessment at the time of the interview   Recent Specific Risk Factors include: current depressive symptoms, current anxiety symptoms    Risk of Harm to Others:   The following ratings are based on assessment at the time of the interview   Recent Specific Risk Factors include: none  The following interventions are recommended: no intervention changes needed    Medications Risks/Benefits:      Risks, Benefits And Possible Side Effects Of Medications:    Discussed risks and benefits of treatment with patient including risk of suicidality, serotonin syndrome, increased QTc interval and SIADH related to treatment with antidepressants; Risk of induction of manic symptoms in certain patient populations, Reduction in seizure threshold related to treatment with bupropion  and Risk of orthostatic hypotension with Prazosin     Controlled Medication Discussion:     Not applicable    Treatment Plan:    Due for update/Updated:   no    DANIEL Magdaleno 08/05/22    This note was shared with patient

## 2022-09-19 DIAGNOSIS — F51.5 NIGHTMARES: ICD-10-CM

## 2022-09-19 RX ORDER — PRAZOSIN HYDROCHLORIDE 1 MG/1
1 CAPSULE ORAL
Qty: 90 CAPSULE | Refills: 0 | Status: SHIPPED | OUTPATIENT
Start: 2022-09-19

## 2022-10-18 DIAGNOSIS — F33.1 MODERATE EPISODE OF RECURRENT MAJOR DEPRESSIVE DISORDER (HCC): ICD-10-CM

## 2022-10-18 DIAGNOSIS — F41.1 GAD (GENERALIZED ANXIETY DISORDER): ICD-10-CM

## 2022-10-18 RX ORDER — ESCITALOPRAM OXALATE 20 MG/1
20 TABLET ORAL DAILY
Qty: 90 TABLET | Refills: 1 | Status: SHIPPED | OUTPATIENT
Start: 2022-10-18

## 2022-11-22 ENCOUNTER — OFFICE VISIT (OUTPATIENT)
Dept: INTERNAL MEDICINE CLINIC | Facility: CLINIC | Age: 27
End: 2022-11-22

## 2022-11-22 VITALS
DIASTOLIC BLOOD PRESSURE: 70 MMHG | SYSTOLIC BLOOD PRESSURE: 122 MMHG | BODY MASS INDEX: 26.54 KG/M2 | HEART RATE: 64 BPM | OXYGEN SATURATION: 98 % | RESPIRATION RATE: 16 BRPM | WEIGHT: 179.2 LBS | HEIGHT: 69 IN

## 2022-11-22 DIAGNOSIS — R04.0 NOSEBLEED: ICD-10-CM

## 2022-11-22 DIAGNOSIS — Z23 NEED FOR VACCINATION: ICD-10-CM

## 2022-11-22 DIAGNOSIS — F41.1 GAD (GENERALIZED ANXIETY DISORDER): Primary | ICD-10-CM

## 2022-11-22 DIAGNOSIS — F33.2 SEVERE EPISODE OF RECURRENT MAJOR DEPRESSIVE DISORDER, WITHOUT PSYCHOTIC FEATURES (HCC): ICD-10-CM

## 2022-11-22 RX ORDER — ESCITALOPRAM OXALATE 10 MG/1
10 TABLET ORAL DAILY
Start: 2022-11-22

## 2022-11-22 NOTE — PROGRESS NOTES
Assessment/Plan:    Severe episode of recurrent major depressive disorder, without psychotic features ([facility] [address] )  She is ready to reduce the escitalopram  She can lower it to 10mg a day  She sees psychiatry in a few weeks and can discuss it with them also  Continue the bupropion 300mg  Continue prazosin for nightmares      Try saline gel/spray to the nose     Problem List Items Addressed This Visit        Other    GISEL (generalized anxiety disorder) - Primary    Relevant Medications    escitalopram (LEXAPRO) 10 mg tablet    Severe episode of recurrent major depressive disorder, without psychotic features ([facility] )     She is ready to reduce the escitalopram  She can lower it to 10mg a day  She sees psychiatry in a few weeks and can discuss it with them also  Continue the bupropion 300mg  Continue prazosin for nightmares         Relevant Medications    escitalopram (LEXAPRO) 10 mg tablet   Other Visit Diagnoses     Need for vaccination        Relevant Orders    influenza vaccine, quadrivalent, 0 5 mL, preservative-free, for adult and pediatric patients 6 mos+ (AFLURIA, FLUARIX, FLULAVAL, FLUZONE) (Completed)    Nosebleed                Subjective:      Patient ID: Silvia Vidales is a 32 y o  female  HPI  Here for a follow up for depression and anxiety  Her symptoms 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  She has been on escitalopram for a few years and bupropion started in March  She saw psychiatry and was placed on prazosin for nightmares in August  This has helped  She is considering weaning off one of her medications    The following portions of the patient's history were reviewed and updated as appropriate: allergies, current medications, past family history, past medical history, past social history, past surgical history and problem list     Review of Systems   Constitutional: Negative for chills, fever and unexpected weight change  HENT: Positive for nosebleeds (during the winter months)  Respiratory: Negative for shortness of breath  Cardiovascular: Negative for chest pain and palpitations  Gastrointestinal: Negative for abdominal pain, constipation and diarrhea  Genitourinary: Positive for menstrual problem  Neurological: Positive for dizziness  Negative for headaches  Psychiatric/Behavioral: Negative for dysphoric mood  The patient is nervous/anxious  Objective:      /70   Pulse 64   Resp 16   Ht 5' 9" (1 753 m)   Wt 81 3 kg (179 lb 3 2 oz)   SpO2 98%   BMI 26 46 kg/m²          Physical Exam  Constitutional:       General: She is not in acute distress  Appearance: She is well-developed and well-nourished  She is not ill-appearing, toxic-appearing or diaphoretic  HENT:      Mouth/Throat:      Mouth: Oropharynx is clear and moist    Eyes:      Conjunctiva/sclera: Conjunctivae normal    Cardiovascular:      Rate and Rhythm: Normal rate and regular rhythm  Heart sounds: Normal heart sounds  No murmur heard  Pulmonary:      Effort: Pulmonary effort is normal  No respiratory distress  Breath sounds: Normal breath sounds  No wheezing or rales  Abdominal:      General: There is no distension  Palpations: Abdomen is soft  There is no mass  Tenderness: There is no abdominal tenderness  There is no guarding or rebound  Musculoskeletal:      Cervical back: Neck supple  Neurological:      Mental Status: She is alert and oriented to person, place, and time  Psychiatric:         Mood and Affect: Mood and affect and mood normal          Behavior: Behavior normal          Thought Content:  Thought content normal          Judgment: Judgment normal

## 2022-11-22 NOTE — ASSESSMENT & PLAN NOTE
She is ready to reduce the escitalopram  She can lower it to 10mg a day  She sees psychiatry in a few weeks and can discuss it with them also  Continue the bupropion 300mg     Continue prazosin for nightmares

## 2023-01-04 ENCOUNTER — TELEPHONE (OUTPATIENT)
Dept: PSYCHIATRY | Facility: CLINIC | Age: 28
End: 2023-01-04

## 2023-01-04 NOTE — TELEPHONE ENCOUNTER
Office attempted to reach patient to schedule a follow up with a new provider as theirs left the practice  Office received patient's voicemail and a message was left requesting a call back to schedule a follow up/ JANET ASAP as they were due to be seen in December  Office number was also left for patient to return call
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
## 2023-02-13 DIAGNOSIS — F41.1 GAD (GENERALIZED ANXIETY DISORDER): ICD-10-CM

## 2023-02-13 DIAGNOSIS — F33.2 SEVERE EPISODE OF RECURRENT MAJOR DEPRESSIVE DISORDER, WITHOUT PSYCHOTIC FEATURES (HCC): ICD-10-CM

## 2023-02-13 RX ORDER — ESCITALOPRAM OXALATE 10 MG/1
TABLET ORAL
Qty: 90 TABLET | Refills: 1 | Status: SHIPPED | OUTPATIENT
Start: 2023-02-13

## 2023-02-17 ENCOUNTER — OFFICE VISIT (OUTPATIENT)
Dept: PSYCHIATRY | Facility: CLINIC | Age: 28
End: 2023-02-17

## 2023-02-17 DIAGNOSIS — F41.1 GAD (GENERALIZED ANXIETY DISORDER): ICD-10-CM

## 2023-02-17 DIAGNOSIS — F33.2 SEVERE EPISODE OF RECURRENT MAJOR DEPRESSIVE DISORDER, WITHOUT PSYCHOTIC FEATURES (HCC): Primary | ICD-10-CM

## 2023-02-17 DIAGNOSIS — F43.10 PTSD (POST-TRAUMATIC STRESS DISORDER): ICD-10-CM

## 2023-02-17 NOTE — BH TREATMENT PLAN
TREATMENT PLAN (Medication Management Only)        [facility]    Name and Date of Birth:  Kenyon Chatman 32 y o  1995  Date of Treatment Plan: February 17, 2023  Diagnosis/Diagnoses:    1  Severe episode of recurrent major depressive disorder, without psychotic features ([address] )    2  GISEL (generalized anxiety disorder)    3  PTSD (post-traumatic stress disorder)      Strengths/Personal Resources for Self-Care: supportive family, ability to communicate needs, ability to understand psychiatric illness, average or above intelligence, good physical health, motivation for treatment, ability to negotiate basic needs, stable employment  Area/Areas of need (in own words): anxiety symptoms, depressive symptoms  1  Long Term Goal: maintain stability of anxiety  Target Date:3 months - 5/17/2023  Person/Persons responsible for completion of goal: Ro  2  Short Term Objective (s) - How will we reach this goal?:   A  Provider new recommended medication/dosage changes and/or continue medication(s): continue current medications as prescribed  B  N/A   C  N/A  Target Date:3 months - 5/17/2023  Person/Persons Responsible for Completion of Goal: Ro  Progress Towards Goals: continuing treatment  Treatment Modality: medication management every 3 months  Review due 180 days from date of this plan: 6 months - 8/17/2023  Expected length of service: ongoing treatment  My Physician/PA/NP and I have developed this plan together and I agree to work on the goals and objectives  I understand the treatment goals that were developed for my treatment

## 2023-02-24 DIAGNOSIS — F33.2 SEVERE EPISODE OF RECURRENT MAJOR DEPRESSIVE DISORDER, WITHOUT PSYCHOTIC FEATURES (HCC): ICD-10-CM

## 2023-02-24 RX ORDER — BUPROPION HYDROCHLORIDE 300 MG/1
300 TABLET ORAL DAILY
Qty: 90 TABLET | Refills: 0 | Status: SHIPPED | OUTPATIENT
Start: 2023-02-24 | End: 2023-08-23

## 2023-05-26 DIAGNOSIS — F33.2 SEVERE EPISODE OF RECURRENT MAJOR DEPRESSIVE DISORDER, WITHOUT PSYCHOTIC FEATURES (HCC): ICD-10-CM

## 2023-05-26 RX ORDER — BUPROPION HYDROCHLORIDE 300 MG/1
300 TABLET ORAL DAILY
Qty: 90 TABLET | Refills: 0 | Status: SHIPPED | OUTPATIENT
Start: 2023-05-26 | End: 2023-11-22

## 2023-06-16 ENCOUNTER — OFFICE VISIT (OUTPATIENT)
Dept: INTERNAL MEDICINE CLINIC | Facility: CLINIC | Age: 28
End: 2023-06-16
Payer: COMMERCIAL

## 2023-06-16 VITALS
OXYGEN SATURATION: 99 % | DIASTOLIC BLOOD PRESSURE: 80 MMHG | SYSTOLIC BLOOD PRESSURE: 118 MMHG | WEIGHT: 180.2 LBS | HEIGHT: 69 IN | HEART RATE: 58 BPM | BODY MASS INDEX: 26.69 KG/M2

## 2023-06-16 DIAGNOSIS — Z00.00 ANNUAL PHYSICAL EXAM: Primary | ICD-10-CM

## 2023-06-16 DIAGNOSIS — F41.1 GAD (GENERALIZED ANXIETY DISORDER): ICD-10-CM

## 2023-06-16 DIAGNOSIS — F33.2 SEVERE EPISODE OF RECURRENT MAJOR DEPRESSIVE DISORDER, WITHOUT PSYCHOTIC FEATURES (HCC): ICD-10-CM

## 2023-06-16 PROCEDURE — 99395 PREV VISIT EST AGE 18-39: CPT | Performed by: INTERNAL MEDICINE

## 2023-06-16 RX ORDER — ESCITALOPRAM OXALATE 20 MG/1
20 TABLET ORAL DAILY
Qty: 90 TABLET | Refills: 3 | Status: SHIPPED | OUTPATIENT
Start: 2023-06-16

## 2023-06-16 RX ORDER — BUPROPION HYDROCHLORIDE 300 MG/1
300 TABLET ORAL DAILY
Qty: 90 TABLET | Refills: 3 | Status: SHIPPED | OUTPATIENT
Start: 2023-06-16 | End: 2023-12-13

## 2023-06-16 NOTE — PROGRESS NOTES
[facility]    NAME: Ramesh Suarez  AGE: 32 y o  SEX: female  : 1995     DATE: 2023     Assessment and Plan:     Problem List Items Addressed This Visit        Other    GISEL (generalized anxiety disorder)    Relevant Medications    escitalopram (LEXAPRO) 20 mg tablet    buPROPion (WELLBUTRIN XL) 300 mg 24 hr tablet    Severe episode of recurrent major depressive disorder, without psychotic features (HCC)    Relevant Medications    escitalopram (LEXAPRO) 20 mg tablet    buPROPion (WELLBUTRIN XL) 300 mg 24 hr tablet   Other Visit Diagnoses     Annual physical exam    -  Primary          Immunizations and preventive care screenings were discussed with patient today  Appropriate education was printed on patient's after visit summary  Counseling:  Exercise: the importance of regular exercise/physical activity was discussed  Recommend exercise 3-5 times per week for at least 30 minutes  BMI Counseling: Body mass index is 26 61 kg/m²  The BMI is above normal  Exercise recommendations include exercising 3-5 times per week  Rationale for BMI follow-up plan is due to patient being overweight or obese  See gyn as scheduled    Return in about 6 months (around 2023)  Chief Complaint:     Chief Complaint   Patient presents with   • Annual Exam      History of Present Illness:     Adult Annual Physical   Patient here for a comprehensive physical exam  The patient reports doing well on Lexapro 20mg and Wellbutrin 300mg  Diet and Physical Activity  Diet/Nutrition: well balanced diet  Exercise: no formal exercise  Depression Screening  PHQ-2/9 Depression Screening         General Health  Sleep: sleeps well  Hearing: normal - bilateral   Vision: no vision problems  Dental: regular dental visits         /GYN Health  Last menstrual period: regular,takes tranexamic acid for the fist 2-3 days; +dysmenorrhea  A few months of midcycle pain on either side of the abdomen relieved by ibuprofen     Review of Systems:     Review of Systems   Constitutional: Positive for unexpected weight change (weight gain)  Negative for chills, fatigue and fever  HENT: Negative for hearing loss  Respiratory: Negative for cough and shortness of breath  Cardiovascular: Negative for chest pain, palpitations and leg swelling  Gastrointestinal: Negative for abdominal pain, constipation, diarrhea, nausea and vomiting  Genitourinary: Positive for menstrual problem  Musculoskeletal: Negative for arthralgias and myalgias  Neurological: Negative for dizziness and headaches  Psychiatric/Behavioral: The patient is nervous/anxious  Past Medical History:     Past Medical History:   Diagnosis Date   • Anxiety    • Depression    • Depression, recurrent ([facility] [address] ) 03/02/2022   • Lyme disease     as a child    • Pap smear for cervical cancer screening     2/2020-wnl   • Pelvic pain 07/30/2020   • Recurrent UTI 07/30/2020   • Vaccine for human papilloma virus (HPV) types 6, 11, 16, and 18 administered 2020   • Varicella vaccination       Past Surgical History:     Past Surgical History:   Procedure Laterality Date   • WISDOM TOOTH EXTRACTION  06/22/2021      Social History:     Social History     Socioeconomic History   • Marital status: Single     Spouse name: None   • Number of children: 0   • Years of education: None   • Highest education level:  Bachelor's degree (e g , BA, AB, BS)   Occupational History   • Occupation: teacher     Comment: reading, [place]   Tobacco Use   • Smoking status: Never   • Smokeless tobacco: Never   Vaping Use   • Vaping Use: Never used   Substance and Sexual Activity   • Alcohol use: Not Currently     Alcohol/week: 4 0 standard drinks of alcohol     Types: 2 Glasses of wine, 2 Cans of beer per week   • Drug use: Never   • Sexual activity: Yes     Partners: Male     Birth control/protection: Condom Male     Comment: lifetime partners: 1   Other Topics Concern   • None   Social History Narrative    Mandaen preferences: none    Accepts blood products        Exercise: 3-4 a week via yoga ~30 min    Calcium: 2 cup of almond milk daily  Multivitamin 3x/week, oj with ca++ 1 daily     Social Determinants of Health     Financial Resource Strain: Not on file   Food Insecurity: Not on file   Transportation Needs: Not on file   Physical Activity: Not on file   Stress: Not on file   Social Connections: Not on file   Intimate Partner Violence: Not on file   Housing Stability: Not on file      Family History:     Family History   Problem Relation Age of Onset   • Hashimoto's thyroiditis Mother    • Hypothyroidism Mother    • No Known Problems Father    • Diabetes Maternal Grandfather    • Diabetes type I Sister    • Depression Sister    • Diabetes Sister    • Anxiety disorder Sister    • No Known Problems Brother    • Dementia Maternal Grandmother    • Alcohol abuse Maternal Grandmother    • Dementia Paternal Grandmother    • No Known Problems Paternal Grandfather    • OCD Sister    • Self-Injury Sister    • No Known Problems Sister    • No Known Problems Brother    • No Known Problems Brother    • No Known Problems Brother    • Breast cancer Neg Hx    • Ovarian cancer Neg Hx    • Colon cancer Neg Hx       Current Medications:     Current Outpatient Medications   Medication Sig Dispense Refill   • buPROPion (WELLBUTRIN XL) 300 mg 24 hr tablet Take 1 tablet (300 mg total) by mouth daily 90 tablet 3   • escitalopram (LEXAPRO) 20 mg tablet Take 1 tablet (20 mg total) by mouth daily 90 tablet 3   • Tranexamic Acid 650 MG TABS Take 2 tablets (1,300 mg total) by mouth every 8 (eight) hours as needed (for heavy bleeding up to 5 days of each menstrual cycle) 90 tablet 3     No current facility-administered medications for this visit        Allergies:     No Known Allergies   Physical Exam:     /80 (BP Location: Right arm, "Patient Position: Sitting, Cuff Size: Standard)   Pulse 58   Ht 5' 9\" (1 753 m)   Wt 81 7 kg (180 lb 3 2 oz)   SpO2 99%   BMI 26 61 kg/m²     Physical Exam  Constitutional:       General: She is not in acute distress  Appearance: She is well-developed  She is not ill-appearing, toxic-appearing or diaphoretic  HENT:      Head: Normocephalic and atraumatic  Right Ear: External ear normal  There is no impacted cerumen  Left Ear: External ear normal  There is no impacted cerumen  Eyes:      Conjunctiva/sclera: Conjunctivae normal    Cardiovascular:      Rate and Rhythm: Normal rate and regular rhythm  Heart sounds: Normal heart sounds  No murmur heard  Pulmonary:      Effort: Pulmonary effort is normal  No respiratory distress  Breath sounds: Normal breath sounds  No stridor  No wheezing or rales  Abdominal:      General: There is no distension  Palpations: Abdomen is soft  There is no mass  Tenderness: There is no abdominal tenderness  There is no guarding or rebound  Musculoskeletal:      Cervical back: Neck supple  Right lower leg: No edema  Left lower leg: No edema  Neurological:      Mental Status: She is alert and oriented to person, place, and time  Psychiatric:         Mood and Affect: Mood normal          Behavior: Behavior normal          Thought Content:  Thought content normal          Judgment: Judgment normal           Maricarmen Kaur MD   [facility]  "

## 2023-07-14 ENCOUNTER — VBI (OUTPATIENT)
Dept: ADMINISTRATIVE | Facility: OTHER | Age: 28
End: 2023-07-14

## 2023-09-13 ENCOUNTER — TELEMEDICINE (OUTPATIENT)
Dept: INTERNAL MEDICINE CLINIC | Facility: CLINIC | Age: 28
End: 2023-09-13
Payer: COMMERCIAL

## 2023-09-13 ENCOUNTER — TELEPHONE (OUTPATIENT)
Age: 28
End: 2023-09-13

## 2023-09-13 VITALS — HEART RATE: 68 BPM | TEMPERATURE: 98.3 F

## 2023-09-13 DIAGNOSIS — U07.1 COVID-19: Primary | ICD-10-CM

## 2023-09-13 PROCEDURE — 99213 OFFICE O/P EST LOW 20 MIN: CPT | Performed by: INTERNAL MEDICINE

## 2023-09-13 NOTE — TELEPHONE ENCOUNTER
----- Message from Art Folks sent at 9/13/2023  8:50 AM EDT -----  Regarding: Covid question  Contact: [phone]  Hi there, I tested positive for covid (at home test) and I'm not sure how long I need to self isolate. My symptoms technically started on Saturday (scratchy throat) but I took two tests Saturday evening and they were both negative. Then I got really sick on Sunday, and now I'm positive. I know it's five days from onset of symptoms, but do I start from Saturday or Sunday, since my tests were negative on Saturday? I need to know if I'm allowed to go to work Thursday.

## 2023-09-13 NOTE — LETTER
[facility] [address]  Dept: [phone]    September 13, 2023    Patient: Sully Sherman  YOB: 1995    Sully Sherman was seen and evaluated at our [facility]. She has tested positive for COVID. She may return to work when free for 24 hours without the use of a fever reducing agent. Her tentative return to work date is 9/15/23. Upon return, they must then adhere to strict masking for an additional 4 days.     Sincerely,    Lanette Boateng MD

## 2023-09-13 NOTE — PROGRESS NOTES
Virtual Regular Visit    Verification of patient location:    Patient is located at Home in the following state in which I hold an active license [place]      Assessment/Plan:    Problem List Items Addressed This Visit    None  Visit Diagnoses     COVID-19    -  Primary      -Patient to take Mucinex DM as needed for cough and congestion  -Tylenol as needed pain and/or fever  -Her tentative return to work date is 9/15/23. A work note has been generated. Reason for visit is   Chief Complaint   Patient presents with   • COVID-19     Pt tested positive for Covid    • Virtual Regular Visit        Encounter provider Mason Arzate MD    Provider located at [facility] [id number]      Recent Visits  No visits were found meeting these conditions. Showing recent visits within past 7 days and meeting all other requirements  Today's Visits  Date Type Provider Dept   09/13/23 Telemedicine Mason Arzate, [facility] today's visits and meeting all other requirements  Future Appointments  No visits were found meeting these conditions. Showing future appointments within next 150 days and meeting all other requirements       The patient was identified by name and date of birth. Ro Jackson was informed that this is a telemedicine visit and that the visit is being conducted through the [employer]. She agrees to proceed. .  My office door was closed. No one else was in the room. She acknowledged consent and understanding of privacy and security of the video platform. The patient has agreed to participate and understands they can discontinue the visit at any time. Patient is aware this is a billable service. Cheryl Faye is a 32 y.o. female. She presents today as an acute visit. She reports she tested positive for COVID on 9/12.   Her symptoms started on 9/10 in the form of a scratchy throat. On 9/11 she developed fever and on 9/12 she lost her sense of smell. Currently she endorses cough productive of sputum. She denies any shortness of breath or wheezing. HPI     Past Medical History:   Diagnosis Date   • Anxiety    • Depression    • Depression, recurrent ([address] ) 03/02/2022   • Lyme disease     as a child    • Pap smear for cervical cancer screening     2/2020-wnl   • Pelvic pain 07/30/2020   • Recurrent UTI 07/30/2020   • Vaccine for human papilloma virus (HPV) types 6, 11, 16, and 18 administered 2020   • Varicella vaccination        Past Surgical History:   Procedure Laterality Date   • WISDOM TOOTH EXTRACTION  06/22/2021       Current Outpatient Medications   Medication Sig Dispense Refill   • buPROPion (WELLBUTRIN XL) 300 mg 24 hr tablet Take 1 tablet (300 mg total) by mouth daily 90 tablet 3   • escitalopram (LEXAPRO) 20 mg tablet Take 1 tablet (20 mg total) by mouth daily 90 tablet 3   • Tranexamic Acid 650 MG TABS Take 2 tablets (1,300 mg total) by mouth every 8 (eight) hours as needed (for heavy bleeding up to 5 days of each menstrual cycle) 90 tablet 3     No current facility-administered medications for this visit. No Known Allergies    Review of Systems All other systems negative except for pertinent findings noted in HPI.        Video Exam    Vitals:    09/13/23 1607   Pulse: 68   Temp: 98.3 °F (36.8 °C)       Physical Exam   General: No acute distress  HEENT: NCAT  Pulmonary: No respiratory distress  Psychiatric: Normal mood and affect     Visit Time  Total Visit Duration: 8 minutes

## 2023-09-14 ENCOUNTER — TELEPHONE (OUTPATIENT)
Dept: PSYCHIATRY | Facility: CLINIC | Age: 28
End: 2023-09-14

## 2023-09-14 NOTE — TELEPHONE ENCOUNTER
Called pt to set up f/u appt with pasha Marquez.  Lm to contact office Returned call to pt. Explained we have not received anything. He stated it should be delivered today or tomorrow.

## 2023-10-31 ENCOUNTER — ANNUAL EXAM (OUTPATIENT)
Dept: OBGYN CLINIC | Facility: CLINIC | Age: 28
End: 2023-10-31
Payer: COMMERCIAL

## 2023-10-31 VITALS
DIASTOLIC BLOOD PRESSURE: 76 MMHG | WEIGHT: 173.8 LBS | SYSTOLIC BLOOD PRESSURE: 128 MMHG | BODY MASS INDEX: 25.74 KG/M2 | HEIGHT: 69 IN

## 2023-10-31 DIAGNOSIS — Z12.4 PAP SMEAR FOR CERVICAL CANCER SCREENING: ICD-10-CM

## 2023-10-31 DIAGNOSIS — Z01.419 ENCOUNTER FOR ANNUAL ROUTINE GYNECOLOGICAL EXAMINATION: Primary | ICD-10-CM

## 2023-10-31 DIAGNOSIS — N84.1 CERVICAL POLYP: ICD-10-CM

## 2023-10-31 DIAGNOSIS — N92.0 MENORRHAGIA WITH REGULAR CYCLE: ICD-10-CM

## 2023-10-31 PROCEDURE — G0145 SCR C/V CYTO,THINLAYER,RESCR: HCPCS | Performed by: OBSTETRICS & GYNECOLOGY

## 2023-10-31 PROCEDURE — S0612 ANNUAL GYNECOLOGICAL EXAMINA: HCPCS | Performed by: OBSTETRICS & GYNECOLOGY

## 2023-10-31 PROCEDURE — G0476 HPV COMBO ASSAY CA SCREEN: HCPCS | Performed by: OBSTETRICS & GYNECOLOGY

## 2023-10-31 RX ORDER — TRANEXAMIC ACID 650 MG/1
1300 TABLET ORAL EVERY 8 HOURS PRN
Qty: 90 TABLET | Refills: 3 | Status: SHIPPED | OUTPATIENT
Start: 2023-10-31

## 2023-10-31 NOTE — PROGRESS NOTES
Pt is a 25 y.o. Neal Bigness with Patient's last menstrual period was 10/08/2023 (exact date). using condoms (male) for [facility] presents for preventive care. She notes the same partner since her last STI evaluation. In her lifetime she has been involved with 1 partner . Safe sexual practices (monogomy, condoms) are followed consistently. She does  feel safe in the relationship. She does feel safe in her home. Her calcium intake encompasses  multivitamin, oj with added calcium and milk (cow, goat, almond, cashew, soy, etc) for a total of 6-7 servings daily on average. She does take additional Vitamin D (MVI or supplement). She exercises 3-4 times per week. Her menses occur every 28-29 Days, last 6-7 days and require regular pad every  2-6  hours. Menstrual History:  OB History          0    Para   0    Term   0       0    AB   0    Living   0         SAB   0    IAB   0    Ectopic   0    Multiple   0    Live Births   0           Obstetric Comments   Menarche: 10    Menses: every 29-30 days, 7 days, first 2 days one reg pad every 2-3 hrs, one pad every 5-6 hours the other days              Menarche age: 13  Patient's last menstrual period was 10/08/2023 (exact date). She has completed the HPV vaccine series appropriate for age.   tobacco use : does not use tobacco              Colonoscopy: not indicated  Mammogram: not indicated  Pap: 2020-wnl, repeat collected today    Past Medical History:   Diagnosis Date    Anxiety     Depression     Depression, recurrent ([address]) 2022    Lyme disease     as a child     Pap smear for cervical cancer screening     2020-wnl; 10/2023-    Pelvic pain 2020    Recurrent UTI 2020    Vaccine for human papilloma virus (HPV) types 6, 11, 16, and 18 administered     Varicella vaccination        Past Surgical History:   Procedure Laterality Date    WISDOM TOOTH EXTRACTION  2021       OB History    Para Term  AB Living 0 0 0 0 0 0   SAB IAB Ectopic Multiple Live Births   0 0 0 0 0   Obstetric Comments   Menarche: 10      Menses: every 29-30 days, 7 days, first 2 days one reg pad every 2-3 hrs, one pad every 5-6 hours the other days            Current Outpatient Medications:     buPROPion (WELLBUTRIN XL) 300 mg 24 hr tablet, Take 1 tablet (300 mg total) by mouth daily, Disp: 90 tablet, Rfl: 3    escitalopram (LEXAPRO) 20 mg tablet, Take 1 tablet (20 mg total) by mouth daily, Disp: 90 tablet, Rfl: 3    Tranexamic Acid 650 MG TABS, Take 2 tablets (1,300 mg total) by mouth every 8 (eight) hours as needed (for heavy bleeding up to 5 days of each menstrual cycle), Disp: 90 tablet, Rfl: 3    No Known Allergies    Social History     Socioeconomic History    Marital status: Single     Spouse name: None    Number of children: 0    Years of education: None    Highest education level: Bachelor's degree (e.g., BA, AB, BS)   Occupational History    Occupation: [employer]    Occupation: [employer]   Tobacco Use    Smoking status: Never    Smokeless tobacco: Never   Vaping Use    Vaping Use: Never used   Substance and Sexual Activity    Alcohol use: Yes     Alcohol/week: 4.0 standard drinks of alcohol     Types: 2 Glasses of wine, 2 Cans of beer per week    Drug use: Never    Sexual activity: Yes     Partners: Male     Birth control/protection: Condom Male     Comment: lifetime partners: 1   Other Topics Concern    None   Social History Narrative    Rastafari preferences: none    Accepts blood products        Exercise: 3-4 a week via yoga ~30 min    Calcium: 2 cup of almond milk daily.  Multivitamin daily, oj with ca++ 1 a few times/week     Social Determinants of Health     Financial Resource Strain: Not on file   Food Insecurity: Not on file   Transportation Needs: Not on file   Physical Activity: Not on file   Stress: Not on file   Social Connections: Not on file   Intimate Partner Violence: Not on file   Housing Stability: Not on file       Family History   Problem Relation Age of Onset    Hashimoto's thyroiditis Mother     Hypothyroidism Mother     No Known Problems Father     Diabetes type I Sister     Depression Sister     Anxiety disorder Sister     OCD Sister     Self-Injury Sister     No Known Problems Sister     No Known Problems Brother     No Known Problems Brother     No Known Problems Brother     No Known Problems Brother     Dementia Maternal Grandmother     Alcohol abuse Maternal Grandmother     Diabetes Maternal Grandfather     Dementia Paternal Grandmother     No Known Problems Paternal Grandfather     Breast cancer Neg Hx     Ovarian cancer Neg Hx     Colon cancer Neg Hx        Blood pressure 128/76, height 5' 9" (1.753 m), weight 78.8 kg (173 lb 12.8 oz), last menstrual period 10/08/2023. and Body mass index is 25.67 kg/m². Physical Exam  Constitutional:       General: She is not in acute distress. Appearance: Normal appearance. She is well-developed and normal weight. She is not ill-appearing. HENT:      Head: Normocephalic and atraumatic. Eyes:      Extraocular Movements: Extraocular movements intact. Conjunctiva/sclera: Conjunctivae normal.   Neck:      Thyroid: No thyromegaly. Trachea: No tracheal deviation. Cardiovascular:      Rate and Rhythm: Normal rate and regular rhythm. Heart sounds: Normal heart sounds. Pulmonary:      Effort: Pulmonary effort is normal. No respiratory distress. Breath sounds: Normal breath sounds. No stridor. No wheezing or rales. Abdominal:      General: Abdomen is flat. Bowel sounds are normal. There is no distension. Palpations: Abdomen is soft. There is no mass. Tenderness: There is no abdominal tenderness. There is no guarding or rebound. Hernia: No hernia is present. Musculoskeletal:         General: No tenderness. Normal range of motion. Cervical back: Normal range of motion and neck supple.    Lymphadenopathy:      Cervical: No cervical adenopathy. Skin:     General: Skin is warm. Findings: No erythema or rash. Neurological:      Mental Status: She is alert and oriented to person, place, and time. Psychiatric:         Mood and Affect: Mood normal.         Behavior: Behavior normal.         Thought Content: Thought content normal.         Judgment: Judgment normal.         Breasts: breasts appear normal, no suspicious masses, no skin or nipple changes or axillary nodes, symmetric fibrous changes in both upper outer quadrants. vulva: normal external genitalia for age and no lesions, masses, epithelial changes, or exudate  vagina: color pink and rugae  well formed rugae  cervix: nullip and endocervical polyp, less than 1 cm, pt notified, will monitor and remove when large enough to grasp  uterus: NSSC, AF, NT, mobile  adnexa: no masses or tenderness      A/P:  Pt is a 25 y.o. Luz Marina Burns with      Ro was seen today for gynecologic exam.    Diagnoses and all orders for this visit:    Encounter for annual routine gynecological examination  -benign examination  -pap updated    Menorrhagia with regular cycle  -     Tranexamic Acid 650 MG TABS; Take 2 tablets (1,300 mg total) by mouth every 8 (eight) hours as needed (for heavy bleeding up to 5 days of each menstrual cycle)    Pap smear for cervical cancer screening  -     Liquid-based pap, screening    Cervical polyp  <1 cm in size, will plan removal when able to grasp. Pt advised of finding as well.

## 2023-11-01 LAB
HPV HR 12 DNA CVX QL NAA+PROBE: NEGATIVE
HPV16 DNA CVX QL NAA+PROBE: NEGATIVE
HPV18 DNA CVX QL NAA+PROBE: NEGATIVE

## 2023-11-07 LAB
LAB AP GYN PRIMARY INTERPRETATION: NORMAL
Lab: NORMAL

## 2023-12-02 ENCOUNTER — VBI (OUTPATIENT)
Dept: ADMINISTRATIVE | Facility: OTHER | Age: 28
End: 2023-12-02

## 2024-02-21 PROBLEM — Z01.419 ENCOUNTER FOR ANNUAL ROUTINE GYNECOLOGICAL EXAMINATION: Status: RESOLVED | Noted: 2020-02-12 | Resolved: 2024-02-21

## 2024-05-16 ENCOUNTER — OFFICE VISIT (OUTPATIENT)
Dept: INTERNAL MEDICINE CLINIC | Facility: CLINIC | Age: 29
End: 2024-05-16
Payer: COMMERCIAL

## 2024-05-16 VITALS
SYSTOLIC BLOOD PRESSURE: 114 MMHG | BODY MASS INDEX: 27.05 KG/M2 | DIASTOLIC BLOOD PRESSURE: 72 MMHG | WEIGHT: 182.6 LBS | HEIGHT: 69 IN

## 2024-05-16 DIAGNOSIS — F41.1 GAD (GENERALIZED ANXIETY DISORDER): ICD-10-CM

## 2024-05-16 DIAGNOSIS — F90.2 ATTENTION DEFICIT HYPERACTIVITY DISORDER (ADHD), COMBINED TYPE: Primary | ICD-10-CM

## 2024-05-16 DIAGNOSIS — F33.2 SEVERE EPISODE OF RECURRENT MAJOR DEPRESSIVE DISORDER, WITHOUT PSYCHOTIC FEATURES (HCC): ICD-10-CM

## 2024-05-16 PROCEDURE — 99214 OFFICE O/P EST MOD 30 MIN: CPT | Performed by: INTERNAL MEDICINE

## 2024-05-16 NOTE — PROGRESS NOTES
"Ambulatory Visit  Name: Ro Angel      : 1995      MRN: [id number]  Encounter Provider: Lea Reyes, MD  Encounter Date: 2024   Enc[facility] department: [facility]    Assessment & Plan   1. Attention deficit hyperactivity disorder (ADHD), combined type  Assessment & Plan:  Symptoms consistent with ADHD warranting treatment  UDS obtained and then will prescribe Adderall  Continue f/u with cousnelor  Orders:  -     Urine Drug Screen  -     Methylphenidate  -     Fentanyl with Confirmation  -     Buprenorphine w/ Naloxone  -     Naltrexone  -     Gabapentin  -     Pregabalin  -     Synthetic Stimulants  -     [facility] Prescribed Medications  -     amphetamine-dextroamphetamine (ADDERALL, 5MG,) 5 MG tablet; Take 1 tablet (5 mg total) by mouth daily Max Daily Amount: 5 mg  2. GISEL (generalized anxiety disorder)  3. Severe episode of recurrent major depressive disorder, without psychotic features (HCC)         History of Present Illness     Started to see a new therapist (Madelin Weston  at [facility]) a few months ago mainly for depression and body image issues.She provides a letter today from her most recent visit that she has multiple symptoms consistent with ADHD. 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    Cardiovascular:  Negative for chest pain and palpitations.   Gastrointestinal:  Negative for abdominal pain.   Neurological:  Negative for dizziness and headaches.     Past Medical History:   Diagnosis Date    Anxiety     Depression     Depression, recurrent (HCC) 2022    Lyme disease     as " a child     Pap smear for cervical cancer screening     2/2020-wnl; 10/2023-wnl, HRHPV neg    Pelvic pain 07/30/2020    Recurrent UTI 07/30/2020    Vaccine for human papilloma virus (HPV) types 6, 11, 16, and 18 administered 2020    Varicella vaccination      Past Surgical History:   Procedure Laterality Date    WISDOM TOOTH EXTRACTION  06/22/2021     Family History   Problem Relation Age of Onset    Hashimoto's thyroiditis Mother     Hypothyroidism Mother     No Known Problems Father     Diabetes type I Sister     Depression Sister     Anxiety disorder Sister     OCD Sister     Self-Injury Sister     No Known Problems Sister     No Known Problems Brother     No Known Problems Brother     No Known Problems Brother     No Known Problems Brother     Dementia Maternal Grandmother     Alcohol abuse Maternal Grandmother     Diabetes Maternal Grandfather     Dementia Paternal Grandmother     No Known Problems Paternal Grandfather     Breast cancer Neg Hx     Ovarian cancer Neg Hx     Colon cancer Neg Hx      Social History     Tobacco Use    Smoking status: Never    Smokeless tobacco: Never   Vaping Use    Vaping status: Never Used   Substance and Sexual Activity    Alcohol use: Yes     Alcohol/week: 4.0 standard drinks of alcohol     Types: 2 Glasses of wine, 2 Cans of beer per week    Drug use: Never    Sexual activity: Yes     Partners: Male     Birth control/protection: Condom Male     Comment: lifetime partners: 1     Current Outpatient Medications on File Prior to Visit   Medication Sig    buPROPion (WELLBUTRIN XL) 300 mg 24 hr tablet Take 1 tablet (300 mg total) by mouth daily    escitalopram (LEXAPRO) 20 mg tablet Take 1 tablet (20 mg total) by mouth daily    Tranexamic Acid 650 MG TABS Take 2 tablets (1,300 mg total) by mouth every 8 (eight) hours as needed (for heavy bleeding up to 5 days of each menstrual cycle)     No Known Allergies  Immunization History   Administered Date(s) Administered    COVID-19 MODERNA  "VACC 0.5 ML IM 01/24/2021, 02/22/2021, 10/26/2021    HPV9 02/12/2020, 04/09/2020, 06/15/2020    INFLUENZA 09/17/2021    Influenza, injectable, quadrivalent, preservative free 0.5 mL 09/14/2020, 11/22/2022    Tdap 09/14/2020     Objective     /72 (BP Location: Left arm, Patient Position: Sitting, Cuff Size: Standard)   Ht 5' 9\" (1.753 m)   Wt 82.8 kg (182 lb 9.6 oz)   BMI 26.97 kg/m²     Physical Exam  Constitutional:       General: She is not in acute distress.     Appearance: She is well-developed. She is not ill-appearing, toxic-appearing or diaphoretic.   Eyes:      Conjunctiva/sclera: Conjunctivae normal.   Cardiovascular:      Rate and Rhythm: Normal rate and regular rhythm.      Heart sounds: Normal heart sounds. No murmur heard.  Pulmonary:      Effort: Pulmonary effort is normal. No respiratory distress.      Breath sounds: Normal breath sounds. No stridor. No wheezing or rales.   Abdominal:      General: There is no distension.      Palpations: Abdomen is soft. There is no mass.      Tenderness: There is no abdominal tenderness. There is no guarding or rebound.   Musculoskeletal:      Cervical back: Neck supple.   Neurological:      Mental Status: She is alert and oriented to person, place, and time.   Psychiatric:         Attention and Perception: She is attentive. She does not perceive auditory or visual hallucinations.         Mood and Affect: Mood is anxious.         Behavior: Behavior normal.         Thought Content: Thought content normal. Thought content does not include suicidal ideation.         Judgment: Judgment normal.       Administrative Statements         "

## 2024-05-19 PROBLEM — F90.2 ATTENTION DEFICIT HYPERACTIVITY DISORDER (ADHD), COMBINED TYPE: Status: ACTIVE | Noted: 2024-05-19

## 2024-05-19 LAB
6-BETA NALTREXOL UR CFM-MCNC: NEGATIVE NG/ML
6MAM UR QL: NEGATIVE NG/ML
AMPHETAMINES UR QL: NEGATIVE NG/ML
BARBITURATES UR QL: NEGATIVE NG/ML
BENZODIAZ UR QL: NEGATIVE NG/ML
BUPRENORPHINE UR QL: NEGATIVE NG/ML
BUTYLONE: NEGATIVE NG/ML
BZE UR QL: NEGATIVE NG/ML
CREAT UR-MCNC: 71 MG/DL
ETHANOL UR QL: NEGATIVE NG/ML
FENTANYL: NEGATIVE NG/ML
GABAPENTIN UR-MCNC: NEGATIVE NG/ML
MDPV: NEGATIVE NG/ML
MEPHEDRONE: NEGATIVE NG/ML
METHADONE UR QL: NEGATIVE NG/ML
METHYLONE: NEGATIVE NG/ML
NALTREXONE UR-MCNC: NEGATIVE NG/ML
OPIATES UR QL: NEGATIVE NG/ML
OXIDANTS UR QL: NEGATIVE MCG/ML
OXYCODONE UR QL: NEGATIVE NG/ML
PCP UR QL: NEGATIVE NG/ML
PH UR: 7.2 [PH] (ref 4.5–9)
SL AMB PREGABALIN: NEGATIVE NG/ML
SL AMB RITALINIC ACID: NEGATIVE NG/ML
THC UR QL: NEGATIVE NG/ML

## 2024-05-19 RX ORDER — DEXTROAMPHETAMINE SACCHARATE, AMPHETAMINE ASPARTATE, DEXTROAMPHETAMINE SULFATE AND AMPHETAMINE SULFATE 1.25; 1.25; 1.25; 1.25 MG/1; MG/1; MG/1; MG/1
5 TABLET ORAL DAILY
Qty: 30 TABLET | Refills: 0 | Status: SHIPPED | OUTPATIENT
Start: 2024-05-19

## 2024-05-19 NOTE — ASSESSMENT & PLAN NOTE
Symptoms consistent with ADHD warranting treatment  UDS obtained and then will prescribe Adderall  Continue f/u with cousnelor

## 2024-06-26 ENCOUNTER — RA CDI HCC (OUTPATIENT)
Dept: OTHER | Facility: HOSPITAL | Age: 29
End: 2024-06-26

## 2024-07-03 ENCOUNTER — OFFICE VISIT (OUTPATIENT)
Dept: FAMILY MEDICINE CLINIC | Facility: CLINIC | Age: 29
End: 2024-07-03
Payer: COMMERCIAL

## 2024-07-03 VITALS
OXYGEN SATURATION: 98 % | TEMPERATURE: 98.1 F | SYSTOLIC BLOOD PRESSURE: 112 MMHG | HEIGHT: 68 IN | RESPIRATION RATE: 16 BRPM | HEART RATE: 91 BPM | BODY MASS INDEX: 27.28 KG/M2 | DIASTOLIC BLOOD PRESSURE: 72 MMHG | WEIGHT: 180 LBS

## 2024-07-03 DIAGNOSIS — Z13.6 SCREENING FOR CARDIOVASCULAR CONDITION: ICD-10-CM

## 2024-07-03 DIAGNOSIS — F90.2 ATTENTION DEFICIT HYPERACTIVITY DISORDER (ADHD), COMBINED TYPE: ICD-10-CM

## 2024-07-03 DIAGNOSIS — N94.6 DYSMENORRHEA: ICD-10-CM

## 2024-07-03 DIAGNOSIS — Z00.00 ANNUAL PHYSICAL EXAM: Primary | ICD-10-CM

## 2024-07-03 DIAGNOSIS — F41.1 GAD (GENERALIZED ANXIETY DISORDER): ICD-10-CM

## 2024-07-03 DIAGNOSIS — Z11.59 ENCOUNTER FOR HEPATITIS C SCREENING TEST FOR LOW RISK PATIENT: ICD-10-CM

## 2024-07-03 DIAGNOSIS — F33.2 SEVERE EPISODE OF RECURRENT MAJOR DEPRESSIVE DISORDER, WITHOUT PSYCHOTIC FEATURES (HCC): ICD-10-CM

## 2024-07-03 DIAGNOSIS — N92.0 MENORRHAGIA WITH REGULAR CYCLE: ICD-10-CM

## 2024-07-03 PROCEDURE — 99385 PREV VISIT NEW AGE 18-39: CPT | Performed by: FAMILY MEDICINE

## 2024-07-03 PROCEDURE — 99204 OFFICE O/P NEW MOD 45 MIN: CPT | Performed by: FAMILY MEDICINE

## 2024-07-03 RX ORDER — BUPROPION HYDROCHLORIDE 450 MG/1
450 TABLET, FILM COATED, EXTENDED RELEASE ORAL DAILY
Qty: 30 TABLET | Refills: 3 | Status: SHIPPED | OUTPATIENT
Start: 2024-07-03 | End: 2024-07-07

## 2024-07-03 RX ORDER — DEXTROAMPHETAMINE SACCHARATE, AMPHETAMINE ASPARTATE MONOHYDRATE, DEXTROAMPHETAMINE SULFATE AND AMPHETAMINE SULFATE 2.5; 2.5; 2.5; 2.5 MG/1; MG/1; MG/1; MG/1
10 CAPSULE, EXTENDED RELEASE ORAL EVERY MORNING
Qty: 14 CAPSULE | Refills: 0 | Status: SHIPPED | OUTPATIENT
Start: 2024-07-03 | End: 2024-07-03

## 2024-07-03 NOTE — PROGRESS NOTES
Adult Annual Physical  Name: Ro Angel      : 1995      MRN: [id number]  Encounter Provider: Lashell Martin MD  Encounter Date: 7/3/2024   Encounter department: [facility]    Assessment & Plan   1. Annual physical exam  Comments:  New patient. UTD for pap. Labs ordered to assess for secondary causes for her depression and inattentiveness  2. GISEL (generalized anxiety disorder)  Comments:  On wellbutrin 300 mg and lexapro 20 mg since . Mood uncontrolled, especially the last w2 weeks. Increase wellbutrin to 450 mg daily  Orders:  -     TSH, 3rd generation with Free T4 reflex; Future  -     Vitamin D 25 hydroxy; Future  -     Iron Panel (Includes Ferritin, Iron Sat%, Iron, and TIBC); Future  -     Vitamin B12; Future  -     Comprehensive metabolic panel; Future  3. Menorrhagia with regular cycle  Comments:  Taking TXA 2 pills every 8 hours fo rthe first 2-3 days of her periods. Has been on this for 4 year ago. Didnt tolerate OCP in the past and caused depression  Orders:  -     Iron Panel (Includes Ferritin, Iron Sat%, Iron, and TIBC); Future  -     CBC and differential; Future  4. Dysmenorrhea  Comments:  Gyn following and TXA has helped  Orders:  -     CBC and differential; Future  5. Screening for cardiovascular condition  -     Lipid panel; Future  6. Encounter for hepatitis C screening test for low risk patient  Comments:  Consented  Orders:  -     Hepatitis C antibody; Future  7. Severe episode of recurrent major depressive disorder, without psychotic features (HCC)  -     buPROPion (FORFIVO XL) 450 MG 24 hr tablet; Take 1 tablet (450 mg total) by mouth daily  8. Attention deficit hyperactivity disorder (ADHD), combined type  Comments:  Recently diagnosed and prescribed adderrall 5 mg. Effective but wanes after 3-4 hours. Sugested XR form but prefers to discuss with psych. New pt appt scheduled later this month         Immunizations and preventive care screenings were  discussed with patient today. Appropriate education was printed on patient's after visit summary.    Counseling:  Dental Health: discussed importance of regular tooth brushing, flossing, and dental visits.  Sexual health: discussed sexually transmitted diseases, partner selection, use of condoms, avoidance of unintended pregnancy, and contraceptive alternatives.  Exercise: the importance of regular exercise/physical activity was discussed. Recommend exercise 3-5 times per week for at least 30 minutes.       Depression Screening and Follow-up Plan: Patient's depression screening was positive with a PHQ-9 score of 11. Patient assessed for underlying major depression. Brief counseling provided and recommend additional follow-up/re-evaluation next office visit.       History of Present Illness     Adult Annual Physical:  Patient presents for annual physical.     Diet and Physical Activity:  - Diet/Nutrition: well balanced diet. trying to work on the protion size and eat when hungry  - Exercise: walking. does yoga    Depression Screening:    - PHQ-9 Score: 11    General Health:  - Sleep: 7-8 hours of sleep on average.  - Hearing: normal hearing bilateral ears.  - Vision: no vision problems.  - Dental: regular dental visits. goes every 6 months    /GYN Health:  - Follows with GYN: yes.   - Menopause: premenopausal.   - Last menstrual cycle: 6/26/2024.   - Contraception:. Taking TXA 2 pills every 8 hours fo rthe first 2-3 days of her periods. has wen on this for 4 years      Review of Systems  Past Medical History   Past Medical History:   Diagnosis Date    Anxiety     Depression     Depression, recurrent (HCC) 03/02/2022    Lyme disease     as a child     Pap smear for cervical cancer screening     2/2020-wnl; 10/2023-wnl, HRHPV neg    Pelvic pain 07/30/2020    Recurrent UTI 07/30/2020    Vaccine for human papilloma virus (HPV) types 6, 11, 16, and 18 administered 2020    Varicella vaccination      Past Surgical  History:   Procedure Laterality Date    WISDOM TOOTH EXTRACTION  06/22/2021     Family History   Problem Relation Age of Onset    Hashimoto's thyroiditis Mother     Hypothyroidism Mother     Mental illness Father     Diabetes type I Sister     Depression Sister     Anxiety disorder Sister     OCD Sister     Self-Injury Sister     No Known Problems Sister     No Known Problems Brother     No Known Problems Brother     No Known Problems Brother     No Known Problems Brother     Dementia Maternal Grandmother     Alcohol abuse Maternal Grandmother     Diabetes Maternal Grandfather     Dementia Paternal Grandmother     No Known Problems Paternal Grandfather     Breast cancer Neg Hx     Ovarian cancer Neg Hx     Colon cancer Neg Hx      Current Outpatient Medications on File Prior to Visit   Medication Sig Dispense Refill    escitalopram (LEXAPRO) 20 mg tablet Take 1 tablet (20 mg total) by mouth daily 90 tablet 3    Tranexamic Acid 650 MG TABS Take 2 tablets (1,300 mg total) by mouth every 8 (eight) hours as needed (for heavy bleeding up to 5 days of each menstrual cycle) 90 tablet 3     No current facility-administered medications on file prior to visit.   No Known Allergies   Current Outpatient Medications on File Prior to Visit   Medication Sig Dispense Refill    escitalopram (LEXAPRO) 20 mg tablet Take 1 tablet (20 mg total) by mouth daily 90 tablet 3    Tranexamic Acid 650 MG TABS Take 2 tablets (1,300 mg total) by mouth every 8 (eight) hours as needed (for heavy bleeding up to 5 days of each menstrual cycle) 90 tablet 3     No current facility-administered medications on file prior to visit.      Social History     Tobacco Use    Smoking status: Never    Smokeless tobacco: Never   Vaping Use    Vaping status: Never Used   Substance and Sexual Activity    Alcohol use: Yes     Alcohol/week: 4.0 standard drinks of alcohol     Types: 2 Glasses of wine, 2 Cans of beer per week    Drug use: Never    Sexual activity: Yes  "    Partners: Male     Birth control/protection: Condom Male     Comment: lifetime partners: 1       Objective     /72 (BP Location: Left arm, Patient Position: Sitting, Cuff Size: Adult)   Pulse 91   Temp 98.1 °F (36.7 °C) (Tympanic)   Resp 16   Ht 5' 8.25\" (1.734 m)   Wt 81.6 kg (180 lb)   SpO2 98%   BMI 27.17 kg/m²     Physical Exam  Constitutional:       General: She is not in acute distress.     Appearance: Normal appearance. She is not ill-appearing or toxic-appearing.   HENT:      Head: Normocephalic.      Right Ear: Tympanic membrane normal.      Left Ear: Tympanic membrane normal.   Eyes:      Extraocular Movements: Extraocular movements intact.   Cardiovascular:      Rate and Rhythm: Normal rate and regular rhythm.      Heart sounds: No murmur heard.  Pulmonary:      Effort: Pulmonary effort is normal. No respiratory distress.      Breath sounds: Normal breath sounds. No stridor. No wheezing, rhonchi or rales.   Abdominal:      General: There is no distension.      Palpations: Abdomen is soft. There is no mass.      Tenderness: There is no abdominal tenderness. There is no guarding.      Hernia: No hernia is present.   Musculoskeletal:      Right lower leg: No edema.      Left lower leg: No edema.   Lymphadenopathy:      Cervical: Cervical adenopathy (left anterior cervical) present.   Neurological:      General: No focal deficit present.      Mental Status: She is alert.   Psychiatric:         Mood and Affect: Mood normal.         Behavior: Behavior normal.             "

## 2024-07-07 RX ORDER — BUPROPION HYDROCHLORIDE 150 MG/1
150 TABLET ORAL EVERY MORNING
Qty: 30 TABLET | Refills: 1 | Status: SHIPPED | OUTPATIENT
Start: 2024-07-07 | End: 2025-01-03

## 2024-07-23 ENCOUNTER — PATIENT MESSAGE (OUTPATIENT)
Dept: FAMILY MEDICINE CLINIC | Facility: CLINIC | Age: 29
End: 2024-07-23

## 2024-07-23 DIAGNOSIS — F41.1 GAD (GENERALIZED ANXIETY DISORDER): ICD-10-CM

## 2024-07-23 NOTE — TELEPHONE ENCOUNTER
Medication: escitalopram (LEXAPRO) 20 mg tablet     Dose/Frequency: Take 1 tablet (20 mg total) by mouth daily     Quantity: 90    Pharmacy: [facility]/pharmacy #[id number] - [place], [place] - [address]     Office:   [x] PCP/Provider -   [] Speciality/Provider -     Does the patient have enough for 3 days?   [x] Yes   [] No - Send as HP to POD

## 2024-07-24 RX ORDER — ESCITALOPRAM OXALATE 20 MG/1
20 TABLET ORAL DAILY
Qty: 100 TABLET | Refills: 1 | Status: SHIPPED | OUTPATIENT
Start: 2024-07-24 | End: 2024-07-24 | Stop reason: SDUPTHER

## 2024-07-24 RX ORDER — ESCITALOPRAM OXALATE 20 MG/1
20 TABLET ORAL DAILY
Qty: 100 TABLET | Refills: 1 | Status: SHIPPED | OUTPATIENT
Start: 2024-07-24

## 2024-08-07 ENCOUNTER — OFFICE VISIT (OUTPATIENT)
Dept: FAMILY MEDICINE CLINIC | Facility: CLINIC | Age: 29
End: 2024-08-07
Payer: COMMERCIAL

## 2024-08-07 VITALS
DIASTOLIC BLOOD PRESSURE: 80 MMHG | WEIGHT: 180.4 LBS | TEMPERATURE: 96.9 F | OXYGEN SATURATION: 99 % | RESPIRATION RATE: 16 BRPM | SYSTOLIC BLOOD PRESSURE: 108 MMHG | HEART RATE: 67 BPM | HEIGHT: 68 IN | BODY MASS INDEX: 27.34 KG/M2

## 2024-08-07 DIAGNOSIS — F33.2 SEVERE EPISODE OF RECURRENT MAJOR DEPRESSIVE DISORDER, WITHOUT PSYCHOTIC FEATURES (HCC): ICD-10-CM

## 2024-08-07 DIAGNOSIS — F41.1 GAD (GENERALIZED ANXIETY DISORDER): Primary | ICD-10-CM

## 2024-08-07 DIAGNOSIS — F90.2 ATTENTION DEFICIT HYPERACTIVITY DISORDER (ADHD), COMBINED TYPE: ICD-10-CM

## 2024-08-07 PROCEDURE — 99213 OFFICE O/P EST LOW 20 MIN: CPT | Performed by: FAMILY MEDICINE

## 2024-08-07 RX ORDER — BUPROPION HYDROCHLORIDE 450 MG/1
450 TABLET, FILM COATED, EXTENDED RELEASE ORAL EVERY MORNING
Qty: 90 TABLET | Refills: 0 | Status: SHIPPED | OUTPATIENT
Start: 2024-08-07 | End: 2024-08-08

## 2024-08-08 RX ORDER — BUPROPION HYDROCHLORIDE 150 MG/1
450 TABLET ORAL DAILY
Qty: 270 TABLET | Refills: 1 | Status: SHIPPED | OUTPATIENT
Start: 2024-08-08

## 2024-08-08 NOTE — PROGRESS NOTES
Ambulatory Visit  Name: Ro Angel      : 1995      MRN: [id number]  Encounter Provider: Lashell Martin MD  Encounter Date: 2024   Encounter department: [facility]    Assessment & Plan   1. GISEL (generalized anxiety disorder)  2. Severe episode of recurrent major depressive disorder, without psychotic features (HCC)  Comments:  Improved with wellbutrin 450 mg daily. Lexapro 20 may not be effective and plans to discuss options. Worse prior to he periods. Unfortunatel, OCP made her depression worse. We briefly discussed prozac and buspar. F/u scheduled in 2weeks with psychiatrist  Orders:  -     buPROPion (FORFIVO XL) 450 MG 24 hr tablet; Take 1 tablet (450 mg total) by mouth every morning  3. Attention deficit hyperactivity disorder (ADHD), combined type  Comments:  Neuropsychiatric testing was done and ruled out ADHD.       History of Present Illness     Here for follow up  At the last visit, we increased Wellbutrin from 300--> 450 mg   Lexapro 20 mg continued  Feeling better on the higher dose   States the depression has improved but is still feeling anxious  Met with psychiatry and did neuropsychiatric testing   Does not believe patient has ADD/ADHD therefore stimulant not advised   Also discussed switching lexapro  Has a follow up appt in 2 weeks and plans to discuss this         Review of Systems  Medical History Reviewed by provider this encounter:       Past Medical History   Past Medical History:   Diagnosis Date    Anxiety     Depression     Depression, recurrent (HCC) 2022    Lyme disease     as a child     Pap smear for cervical cancer screening     2020-wnl; 10/2023-wnl, HRHPV neg    Pelvic pain 2020    Recurrent UTI 2020    Vaccine for human papilloma virus (HPV) types 6, 11, 16, and 18 administered     Varicella vaccination      Past Surgical History:   Procedure Laterality Date    WISDOM TOOTH EXTRACTION  2021     Family History    Problem Relation Age of Onset    Hashimoto's thyroiditis Mother     Hypothyroidism Mother     Mental illness Father     Diabetes type I Sister     Depression Sister     Anxiety disorder Sister     OCD Sister     Self-Injury Sister     No Known Problems Sister     No Known Problems Brother     No Known Problems Brother     No Known Problems Brother     No Known Problems Brother     Dementia Maternal Grandmother     Alcohol abuse Maternal Grandmother     Diabetes Maternal Grandfather     Dementia Paternal Grandmother     No Known Problems Paternal Grandfather     Breast cancer Neg Hx     Ovarian cancer Neg Hx     Colon cancer Neg Hx      Current Outpatient Medications on File Prior to Visit   Medication Sig Dispense Refill    escitalopram (LEXAPRO) 20 mg tablet Take 1 tablet (20 mg total) by mouth daily 100 tablet 1    Tranexamic Acid 650 MG TABS Take 2 tablets (1,300 mg total) by mouth every 8 (eight) hours as needed (for heavy bleeding up to 5 days of each menstrual cycle) 90 tablet 3    [DISCONTINUED] buPROPion (WELLBUTRIN XL) 150 mg 24 hr tablet Take 1 tablet (150 mg total) by mouth every morning 30 tablet 1     No current facility-administered medications on file prior to visit.   No Known Allergies   Current Outpatient Medications on File Prior to Visit   Medication Sig Dispense Refill    escitalopram (LEXAPRO) 20 mg tablet Take 1 tablet (20 mg total) by mouth daily 100 tablet 1    Tranexamic Acid 650 MG TABS Take 2 tablets (1,300 mg total) by mouth every 8 (eight) hours as needed (for heavy bleeding up to 5 days of each menstrual cycle) 90 tablet 3    [DISCONTINUED] buPROPion (WELLBUTRIN XL) 150 mg 24 hr tablet Take 1 tablet (150 mg total) by mouth every morning 30 tablet 1     No current facility-administered medications on file prior to visit.      Social History     Tobacco Use    Smoking status: Never    Smokeless tobacco: Never   Vaping Use    Vaping status: Never Used   Substance and Sexual Activity     "Alcohol use: Yes     Alcohol/week: 4.0 standard drinks of alcohol     Types: 2 Glasses of wine, 2 Cans of beer per week    Drug use: Never    Sexual activity: Yes     Partners: Male     Birth control/protection: Condom Male     Comment: lifetime partners: 1     Objective     /80 (BP Location: Left arm, Patient Position: Sitting, Cuff Size: Adult)   Pulse 67   Temp (!) 96.9 °F (36.1 °C) (Tympanic)   Resp 16   Ht 5' 8.25\" (1.734 m)   Wt 81.8 kg (180 lb 6.4 oz)   SpO2 99%   BMI 27.23 kg/m²     Physical Exam  Constitutional:       Appearance: Normal appearance.   HENT:      Head: Normocephalic and atraumatic.   Eyes:      Extraocular Movements: Extraocular movements intact.   Pulmonary:      Effort: Pulmonary effort is normal.   Skin:     General: Skin is warm.   Neurological:      Mental Status: She is alert and oriented to person, place, and time.   Psychiatric:         Mood and Affect: Mood normal.         Behavior: Behavior normal.           "

## 2024-08-09 ENCOUNTER — VBI (OUTPATIENT)
Dept: ADMINISTRATIVE | Facility: OTHER | Age: 29
End: 2024-08-09

## 2024-08-09 NOTE — TELEPHONE ENCOUNTER
08/09/24 10:12 AM     Chart reviewed for Pap Smear (HPV) aka Cervical Cancer Screening was/were submitted to the patient's insurance.     RUTH ANN COLES MA   [facility]

## 2024-08-31 ENCOUNTER — APPOINTMENT (OUTPATIENT)
Dept: LAB | Facility: CLINIC | Age: 29
End: 2024-08-31
Payer: COMMERCIAL

## 2024-08-31 DIAGNOSIS — F41.1 GAD (GENERALIZED ANXIETY DISORDER): ICD-10-CM

## 2024-08-31 DIAGNOSIS — Z11.59 ENCOUNTER FOR HEPATITIS C SCREENING TEST FOR LOW RISK PATIENT: ICD-10-CM

## 2024-08-31 DIAGNOSIS — N92.0 MENORRHAGIA WITH REGULAR CYCLE: ICD-10-CM

## 2024-08-31 DIAGNOSIS — Z13.6 SCREENING FOR CARDIOVASCULAR CONDITION: ICD-10-CM

## 2024-08-31 DIAGNOSIS — N94.6 DYSMENORRHEA: ICD-10-CM

## 2024-08-31 LAB
25(OH)D3 SERPL-MCNC: 35.3 NG/ML (ref 30–100)
ALBUMIN SERPL BCG-MCNC: 4.6 G/DL (ref 3.5–5)
ALP SERPL-CCNC: 54 U/L (ref 34–104)
ALT SERPL W P-5'-P-CCNC: 14 U/L (ref 7–52)
ANION GAP SERPL CALCULATED.3IONS-SCNC: 5 MMOL/L (ref 4–13)
AST SERPL W P-5'-P-CCNC: 17 U/L (ref 13–39)
BILIRUB SERPL-MCNC: 0.52 MG/DL (ref 0.2–1)
BUN SERPL-MCNC: 13 MG/DL (ref 5–25)
CALCIUM SERPL-MCNC: 9.4 MG/DL (ref 8.4–10.2)
CHLORIDE SERPL-SCNC: 106 MMOL/L (ref 96–108)
CHOLEST SERPL-MCNC: 150 MG/DL
CO2 SERPL-SCNC: 27 MMOL/L (ref 21–32)
CREAT SERPL-MCNC: 0.87 MG/DL (ref 0.6–1.3)
FERRITIN SERPL-MCNC: 28 NG/ML (ref 11–307)
GFR SERPL CREATININE-BSD FRML MDRD: 89 ML/MIN/1.73SQ M
GLUCOSE P FAST SERPL-MCNC: 85 MG/DL (ref 65–99)
HDLC SERPL-MCNC: 76 MG/DL
IRON SATN MFR SERPL: 19 % (ref 15–50)
IRON SERPL-MCNC: 57 UG/DL (ref 50–212)
LDLC SERPL CALC-MCNC: 68 MG/DL (ref 0–100)
NONHDLC SERPL-MCNC: 74 MG/DL
POTASSIUM SERPL-SCNC: 4.3 MMOL/L (ref 3.5–5.3)
PROT SERPL-MCNC: 7.4 G/DL (ref 6.4–8.4)
SODIUM SERPL-SCNC: 138 MMOL/L (ref 135–147)
TIBC SERPL-MCNC: 297 UG/DL (ref 250–450)
TRIGL SERPL-MCNC: 29 MG/DL
TSH SERPL DL<=0.05 MIU/L-ACNC: 1.4 UIU/ML (ref 0.45–4.5)
UIBC SERPL-MCNC: 240 UG/DL (ref 155–355)
VIT B12 SERPL-MCNC: 557 PG/ML (ref 180–914)

## 2024-08-31 PROCEDURE — 80053 COMPREHEN METABOLIC PANEL: CPT

## 2024-08-31 PROCEDURE — 83540 ASSAY OF IRON: CPT

## 2024-08-31 PROCEDURE — 82728 ASSAY OF FERRITIN: CPT

## 2024-08-31 PROCEDURE — 36415 COLL VENOUS BLD VENIPUNCTURE: CPT

## 2024-08-31 PROCEDURE — 82607 VITAMIN B-12: CPT

## 2024-08-31 PROCEDURE — 80061 LIPID PANEL: CPT

## 2024-08-31 PROCEDURE — 82306 VITAMIN D 25 HYDROXY: CPT

## 2024-08-31 PROCEDURE — 83550 IRON BINDING TEST: CPT

## 2024-08-31 PROCEDURE — 84443 ASSAY THYROID STIM HORMONE: CPT

## 2024-11-04 ENCOUNTER — ANNUAL EXAM (OUTPATIENT)
Dept: OBGYN CLINIC | Facility: CLINIC | Age: 29
End: 2024-11-04
Payer: COMMERCIAL

## 2024-11-04 VITALS
DIASTOLIC BLOOD PRESSURE: 74 MMHG | WEIGHT: 183 LBS | HEIGHT: 68 IN | BODY MASS INDEX: 27.74 KG/M2 | SYSTOLIC BLOOD PRESSURE: 116 MMHG

## 2024-11-04 DIAGNOSIS — Z01.419 ENCOUNTER FOR ANNUAL ROUTINE GYNECOLOGICAL EXAMINATION: Primary | ICD-10-CM

## 2024-11-04 DIAGNOSIS — N92.0 MENORRHAGIA WITH REGULAR CYCLE: ICD-10-CM

## 2024-11-04 PROBLEM — F90.2 ATTENTION DEFICIT HYPERACTIVITY DISORDER (ADHD), COMBINED TYPE: Status: RESOLVED | Noted: 2024-05-19 | Resolved: 2024-11-04

## 2024-11-04 PROBLEM — N30.10 INTERSTITIAL CYSTITIS: Status: ACTIVE | Noted: 2020-08-04

## 2024-11-04 PROCEDURE — S0612 ANNUAL GYNECOLOGICAL EXAMINA: HCPCS | Performed by: OBSTETRICS & GYNECOLOGY

## 2024-11-04 RX ORDER — VILAZODONE HYDROCHLORIDE 40 MG/1
40 TABLET ORAL
COMMUNITY

## 2024-11-04 RX ORDER — VILAZODONE HYDROCHLORIDE 20 MG/1
TABLET ORAL
COMMUNITY
Start: 2024-09-23 | End: 2024-11-04

## 2024-11-04 RX ORDER — TRANEXAMIC ACID 650 MG/1
1300 TABLET ORAL EVERY 8 HOURS PRN
Qty: 90 TABLET | Refills: 3 | Status: SHIPPED | OUTPATIENT
Start: 2024-11-04

## 2024-11-04 RX ORDER — BUPROPION HYDROCHLORIDE 300 MG/1
1 TABLET ORAL DAILY
COMMUNITY
Start: 2024-10-23 | End: 2024-11-04

## 2024-11-04 RX ORDER — DIPHENOXYLATE HYDROCHLORIDE AND ATROPINE SULFATE 2.5; .025 MG/1; MG/1
1 TABLET ORAL DAILY
COMMUNITY

## 2024-11-04 NOTE — PROGRESS NOTES
Pt is a 29 y.o.  with Patient's last menstrual period was 10/25/2024 (exact date). using condoms (male) for BC presents for preventive care.   She notes the same partner since her last STI evaluation. In her lifetime she has been involved with 1 partner .   Safe sexual practices (monogomy, condoms) are followed consistently.         She does  feel safe in the relationship.  She does feel safe in her home.    Her calcium intake encompasses  multivitamin, oj with added calcium and milk (cow, goat, almond, cashew, soy, etc) for a total of 4-5 servings daily on average.  She does take additional Vitamin D (MVI or supplement).    She exercises 3-4 times per week.  Her menses occur every 29-30 Days, last 6-7 days and require regular pad every  2-6  hours.  Menstrual History:  OB History          0    Para   0    Term   0       0    AB   0    Living   0         SAB   0    IAB   0    Ectopic   0    Multiple   0    Live Births   0           Obstetric Comments   Menarche: 10    Menses: every 29-30 days, 7 days, first 2 days one reg pad every 2-3 hrs, one pad every 5-6 hours the other days              Menarche age: 10  Patient's last menstrual period was 10/25/2024 (exact date).          She has completed the HPV vaccine series appropriate for age.  tobacco use : does not use tobacco              Colonoscopy: not indicated  Mammogram: not indicated  Pap: 10/31/2023-wnl, HRHPV neg    Past Medical History:   Diagnosis Date    Anxiety     Depression     Depression, recurrent (HCC) 2022    Lyme disease     as a child     Pap smear for cervical cancer screening     2020-wnl; 10/2023-wnl, HRHPV neg    Pelvic pain 2020    Recurrent UTI 2020    Vaccine for human papilloma virus (HPV) types 6, 11, 16, and 18 administered     Varicella vaccination        Past Surgical History:   Procedure Laterality Date    WISDOM TOOTH EXTRACTION  2021       OB History    Para Term  AB  Living   0 0 0 0 0 0   SAB IAB Ectopic Multiple Live Births   0 0 0 0 0   Obstetric Comments   Menarche: 10      Menses: every 29-30 days, 7 days, first 2 days one reg pad every 2-3 hrs, one pad every 5-6 hours the other days            Current Outpatient Medications:     buPROPion (WELLBUTRIN XL) 150 mg 24 hr tablet, TAKE 3 TABLETS BY MOUTH EVERY DAY, Disp: 270 tablet, Rfl: 1    multivitamin (THERAGRAN) TABS, Take 1 tablet by mouth daily, Disp: , Rfl:     Tranexamic Acid 650 MG TABS, Take 2 tablets (1,300 mg total) by mouth every 8 (eight) hours as needed (for heavy bleeding up to 5 days of each menstrual cycle), Disp: 90 tablet, Rfl: 3    vilazodone (Viibryd) 40 mg tablet, Take 40 mg by mouth daily with breakfast, Disp: , Rfl:     No Known Allergies    Social History     Socioeconomic History    Marital status: Single     Spouse name: Douglas Haywood    Number of children: 0    Years of education: None    Highest education level: Bachelor's degree (e.g., BA, AB, BS)   Occupational History    Occupation: [employer]   Tobacco Use    Smoking status: Never    Smokeless tobacco: Never   Vaping Use    Vaping status: Never Used   Substance and Sexual Activity    Alcohol use: Not Currently     Alcohol/week: 0.0 - 2.0 standard drinks of alcohol     Comment: socially    Drug use: Never    Sexual activity: Yes     Partners: Male     Birth control/protection: Condom Male     Comment: lifetime partners: 1   Other Topics Concern    None   Social History Narrative    Pentecostalism preferences: none    Accepts blood products        Exercise: 3-4 a week via yoga ~30 min    Calcium: 2 cup of almond milk daily. Multivitamin daily, oj with ca++ 1 a few times/week     Social Determinants of Health     Financial Resource Strain: Not on file   Food Insecurity: Not on file   Transportation Needs: Not on file   Physical Activity: Not on file   Stress: Not on file   Social Connections: Not on file   Intimate Partner Violence: Not on file   Housing  "Stability: Not on file       Family History   Problem Relation Age of Onset    Hashimoto's thyroiditis Mother     Hypothyroidism Mother     Mental illness Father     Diabetes type I Sister     Depression Sister     Anxiety disorder Sister     OCD Sister     Self-Injury Sister     No Known Problems Sister     No Known Problems Brother     No Known Problems Brother     No Known Problems Brother     No Known Problems Brother     Dementia Maternal Grandmother     Alcohol abuse Maternal Grandmother     Diabetes Maternal Grandfather     Dementia Paternal Grandmother     No Known Problems Paternal Grandfather     Breast cancer Neg Hx     Ovarian cancer Neg Hx     Colon cancer Neg Hx        Blood pressure 116/74, height 5' 8\" (1.727 m), weight 83 kg (183 lb), last menstrual period 10/25/2024. and Body mass index is 27.83 kg/m².    Physical Exam  Constitutional:       General: She is not in acute distress.     Appearance: Normal appearance. She is well-developed and normal weight. She is not ill-appearing.   HENT:      Head: Normocephalic and atraumatic.   Eyes:      Extraocular Movements: Extraocular movements intact.      Conjunctiva/sclera: Conjunctivae normal.   Neck:      Thyroid: No thyromegaly.      Trachea: No tracheal deviation.   Cardiovascular:      Rate and Rhythm: Normal rate and regular rhythm.      Heart sounds: Normal heart sounds.   Pulmonary:      Effort: Pulmonary effort is normal. No respiratory distress.      Breath sounds: Normal breath sounds. No stridor. No wheezing or rales.   Abdominal:      General: Bowel sounds are normal. There is no distension.      Palpations: Abdomen is soft. There is no mass.      Tenderness: There is no abdominal tenderness. There is no guarding or rebound.      Hernia: No hernia is present.   Musculoskeletal:         General: No tenderness. Normal range of motion.      Cervical back: Normal range of motion and neck supple.   Lymphadenopathy:      Cervical: No cervical " adenopathy.   Skin:     General: Skin is warm.      Findings: No erythema or rash.   Neurological:      Mental Status: She is alert and oriented to person, place, and time.   Psychiatric:         Mood and Affect: Mood normal.         Behavior: Behavior normal.         Thought Content: Thought content normal.         Judgment: Judgment normal.         Breasts: breasts appear normal, no suspicious masses, no skin or nipple changes or axillary nodes, symmetric fibrous changes in both upper outer quadrants.      vulva: normal external genitalia for age and no lesions, masses, epithelial changes, or exudate  vagina: color pink and rugae  well formed rugae  cervix: nullip, no lesions , and previously seen polyp not visualized today  uterus: NSSC, AF, NT, mobile  adnexa: no masses or tenderness      A/P:  Pt is a 29 y.o.  with      Ro was seen today for gynecologic exam.    Diagnoses and all orders for this visit:    Encounter for annual routine gynecological examination  -stable examination  -pap up to date    Menorrhagia with regular cycle  -     Tranexamic Acid 650 MG TABS; Take 2 tablets (1,300 mg total) by mouth every 8 (eight) hours as needed (for heavy bleeding up to 5 days of each menstrual cycle)

## 2024-11-06 PROBLEM — E55.9 VITAMIN D DEFICIENCY: Status: ACTIVE | Noted: 2024-11-06

## 2024-11-06 PROBLEM — G31.84 MILD COGNITIVE IMPAIRMENT: Status: ACTIVE | Noted: 2024-11-06

## 2024-11-10 ENCOUNTER — APPOINTMENT (EMERGENCY)
Dept: RADIOLOGY | Facility: HOSPITAL | Age: 29
End: 2024-11-10
Payer: COMMERCIAL

## 2024-11-10 ENCOUNTER — HOSPITAL ENCOUNTER (EMERGENCY)
Facility: HOSPITAL | Age: 29
Discharge: HOME | End: 2024-11-10
Attending: EMERGENCY MEDICINE
Payer: COMMERCIAL

## 2024-11-10 VITALS
SYSTOLIC BLOOD PRESSURE: 142 MMHG | HEART RATE: 74 BPM | RESPIRATION RATE: 16 BRPM | OXYGEN SATURATION: 100 % | DIASTOLIC BLOOD PRESSURE: 81 MMHG | TEMPERATURE: 98.6 F

## 2024-11-10 DIAGNOSIS — F41.9 ANXIETY: ICD-10-CM

## 2024-11-10 DIAGNOSIS — R06.02 SHORTNESS OF BREATH: Primary | ICD-10-CM

## 2024-11-10 LAB
ANION GAP SERPL CALCULATED.3IONS-SCNC: 7 MMOL/L (ref 4–13)
BASOPHILS # BLD AUTO: 0.04 THOUSANDS/ÂΜL (ref 0–0.1)
BASOPHILS NFR BLD AUTO: 1 % (ref 0–1)
BUN SERPL-MCNC: 17 MG/DL (ref 5–25)
CALCIUM SERPL-MCNC: 9.4 MG/DL (ref 8.4–10.2)
CARDIAC TROPONIN I PNL SERPL HS: <2 NG/L
CHLORIDE SERPL-SCNC: 104 MMOL/L (ref 96–108)
CO2 SERPL-SCNC: 27 MMOL/L (ref 21–32)
CREAT SERPL-MCNC: 0.97 MG/DL (ref 0.6–1.3)
EOSINOPHIL # BLD AUTO: 0.07 THOUSAND/ÂΜL (ref 0–0.61)
EOSINOPHIL NFR BLD AUTO: 1 % (ref 0–6)
ERYTHROCYTE [DISTWIDTH] IN BLOOD BY AUTOMATED COUNT: 12.2 % (ref 11.6–15.1)
EXT PREGNANCY TEST URINE: NEGATIVE
EXT. CONTROL: NORMAL
GFR SERPL CREATININE-BSD FRML MDRD: 78 ML/MIN/1.73SQ M
GLUCOSE SERPL-MCNC: 82 MG/DL (ref 65–140)
HCT VFR BLD AUTO: 41.6 % (ref 34.8–46.1)
HGB BLD-MCNC: 14 G/DL (ref 11.5–15.4)
IMM GRANULOCYTES # BLD AUTO: 0.01 THOUSAND/UL (ref 0–0.2)
IMM GRANULOCYTES NFR BLD AUTO: 0 % (ref 0–2)
LYMPHOCYTES # BLD AUTO: 1.86 THOUSANDS/ÂΜL (ref 0.6–4.47)
LYMPHOCYTES NFR BLD AUTO: 31 % (ref 14–44)
MCH RBC QN AUTO: 31.2 PG (ref 26.8–34.3)
MCHC RBC AUTO-ENTMCNC: 33.7 G/DL (ref 31.4–37.4)
MCV RBC AUTO: 93 FL (ref 82–98)
MONOCYTES # BLD AUTO: 0.41 THOUSAND/ÂΜL (ref 0.17–1.22)
MONOCYTES NFR BLD AUTO: 7 % (ref 4–12)
NEUTROPHILS # BLD AUTO: 3.66 THOUSANDS/ÂΜL (ref 1.85–7.62)
NEUTS SEG NFR BLD AUTO: 60 % (ref 43–75)
NRBC BLD AUTO-RTO: 0 /100 WBCS
PLATELET # BLD AUTO: 258 THOUSANDS/UL (ref 149–390)
PMV BLD AUTO: 9.5 FL (ref 8.9–12.7)
POTASSIUM SERPL-SCNC: 4 MMOL/L (ref 3.5–5.3)
RBC # BLD AUTO: 4.49 MILLION/UL (ref 3.81–5.12)
SODIUM SERPL-SCNC: 138 MMOL/L (ref 135–147)
WBC # BLD AUTO: 6.05 THOUSAND/UL (ref 4.31–10.16)

## 2024-11-10 PROCEDURE — 84484 ASSAY OF TROPONIN QUANT: CPT

## 2024-11-10 PROCEDURE — 71046 X-RAY EXAM CHEST 2 VIEWS: CPT

## 2024-11-10 PROCEDURE — 36415 COLL VENOUS BLD VENIPUNCTURE: CPT

## 2024-11-10 PROCEDURE — 99285 EMERGENCY DEPT VISIT HI MDM: CPT

## 2024-11-10 PROCEDURE — 81025 URINE PREGNANCY TEST: CPT

## 2024-11-10 PROCEDURE — 93005 ELECTROCARDIOGRAM TRACING: CPT

## 2024-11-10 PROCEDURE — 99285 EMERGENCY DEPT VISIT HI MDM: CPT | Performed by: EMERGENCY MEDICINE

## 2024-11-10 PROCEDURE — 80048 BASIC METABOLIC PNL TOTAL CA: CPT

## 2024-11-10 PROCEDURE — 85025 COMPLETE CBC W/AUTO DIFF WBC: CPT

## 2024-11-11 NOTE — ED ATTENDING ATTESTATION
11/10/2024  I, Марина Cortez MD, saw and evaluated the patient. I have discussed the patient with the resident/non-physician practitioner and agree with the resident's/non-physician practitioner's findings, Plan of Care, and MDM as documented in the resident's/non-physician practitioner's note, except where noted. All available labs and Radiology studies were reviewed.  I was present for key portions of any procedure(s) performed by the resident/non-physician practitioner and I was immediately available to provide assistance.       At this point I agree with the current assessment done in the Emergency Department.  I have conducted an independent evaluation of this patient a history and physical is as follows:    29-year-old presenting to the ER with intermittent shortness of breath.  Has no complaints at this time.  No chest pain.  No fevers or chills.  No cough.  No leg swelling or pain.  Wells low.  PERC negative.    Lungs are clear, regular rate and rhythm, no edema or tenderness lower extremity.      Agree with chest x-ray, EKG Trop CBC.        ED Course         Critical Care Time  Procedures

## 2024-11-11 NOTE — ED PROVIDER NOTES
Time reflects when diagnosis was documented in both MDM as applicable and the Disposition within this note       Time User Action Codes Description Comment    11/10/2024  7:07 PM Dion Choi Add [R06.02] Shortness of breath     11/10/2024  7:08 PM Dion Choi Add [F41.9] Anxiety           ED Disposition       ED Disposition   Discharge    Condition   Stable    Date/Time   Sun Nov 10, 2024  7:07 PM    Comment   Ro Alida Angel discharge to home/self care.                   Assessment & Plan       Medical Decision Making  Patient seen and examined.  Patient is tearful and anxious on presentation.  Physical exam is otherwise WNL.  Most likely anxiety, less likely ACS, PE, pneumonia.  Appropriate orders placed.    EKG shows sinus rhythm, troponin less than 2, other labs WNL.  Chest x-ray shows no acute cardiopulmonary disease.  Patient updated on all results, she expressed understanding.  Patient is agreeable to discharge home with follow-up with primary care provider, all questions answered, return precautions discussed.    Amount and/or Complexity of Data Reviewed  Labs: ordered.  Radiology: ordered and independent interpretation performed.        HEART Risk Score      Flowsheet Row Most Recent Value   Heart Score Risk Calculator    History 0 Filed at: 11/11/2024 0117   ECG 0 Filed at: 11/11/2024 0117   Age 0 Filed at: 11/11/2024 0117   Risk Factors 0 Filed at: 11/11/2024 0117   Troponin 0 Filed at: 11/11/2024 0117   HEART Score 0 Filed at: 11/11/2024 0117                  Medications - No data to display    ED Risk Strat Scores                           SBIRT 22yo+      Flowsheet Row Most Recent Value   Initial Alcohol Screen: US AUDIT-C     1. How often do you have a drink containing alcohol? 0 Filed at: 11/10/2024 1757   2. How many drinks containing alcohol do you have on a typical day you are drinking?  0 Filed at: 11/10/2024 1757   3a. Male UNDER 65: How often do you have five or more drinks on one  occasion? 0 Filed at: 11/10/2024 1757   3b. FEMALE Any Age, or MALE 65+: How often do you have 4 or more drinks on one occassion? 0 Filed at: 11/10/2024 1757   Audit-C Score 0 Filed at: 11/10/2024 1757   TREY: How many times in the past year have you...    Used an illegal drug or used a prescription medication for non-medical reasons? Never Filed at: 11/10/2024 1757            Wells' Criteria for PE      Flowsheet Row Most Recent Value   Wells' Criteria for PE    Clinical signs and symptoms of DVT 0 Filed at: 11/10/2024 1819   PE is primary diagnosis or equally likely 0 Filed at: 11/10/2024 1819   HR >100 0 Filed at: 11/10/2024 1819   Immobilization at least 3 days or Surgery in the previous 4 weeks 0 Filed at: 11/10/2024 1819   Previous, objectively diagnosed PE or DVT 0 Filed at: 11/10/2024 1819   Hemoptysis 0 Filed at: 11/10/2024 1819   Malignancy with treatment within 6 months or palliative 0 Filed at: 11/10/2024 1819   Wells' Criteria Total 0 Filed at: 11/10/2024 1819                        History of Present Illness       Chief Complaint   Patient presents with    Shortness of Breath     Patient states SOB for a week ago, [facility], SOB not getting better +chest tightness. +cough/dry/non productive       Past Medical History:   Diagnosis Date    Anxiety     Depression     Depression, recurrent (HCC) 03/02/2022    Lyme disease     as a child     Pap smear for cervical cancer screening     2/2020-wnl; 10/2023-wnl, HRHPV neg    Pelvic pain 07/30/2020    Recurrent UTI 07/30/2020    Vaccine for human papilloma virus (HPV) types 6, 11, 16, and 18 administered 2020    Varicella vaccination       Past Surgical History:   Procedure Laterality Date    WISDOM TOOTH EXTRACTION  06/22/2021      Family History   Problem Relation Age of Onset    Hashimoto's thyroiditis Mother     Hypothyroidism Mother     Mental illness Father     Diabetes type I Sister     Depression Sister     Anxiety disorder Sister      OCD Sister     Self-Injury Sister     No Known Problems Sister     No Known Problems Brother     No Known Problems Brother     No Known Problems Brother     No Known Problems Brother     Dementia Maternal Grandmother     Alcohol abuse Maternal Grandmother     Diabetes Maternal Grandfather     Dementia Paternal Grandmother     No Known Problems Paternal Grandfather     Breast cancer Neg Hx     Ovarian cancer Neg Hx     Colon cancer Neg Hx       Social History     Tobacco Use    Smoking status: Never    Smokeless tobacco: Never   Vaping Use    Vaping status: Never Used   Substance Use Topics    Alcohol use: Not Currently     Alcohol/week: 0.0 - 2.0 standard drinks of alcohol     Comment: socially    Drug use: Never      E-Cigarette/Vaping    E-Cigarette Use Never User       E-Cigarette/Vaping Substances    Nicotine No     THC No     CBD No     Flavoring No     Other No     Unknown No       I have reviewed and agree with the history as documented.     29-year-old woman presenting with intermittent shortness of breath x 1 week.  She has noticed that she is occasionally short of breath while having conversations or at rest.  She does not feel short of breath while walking up stairs or exercising.  She has a history of anxiety with recent medication change 1 month ago.  She takes TXA for the first 3 days for.,  Last dose October 29.  She notes symptoms started around November 3.  She was seen in urgent care today who sent her here for further evaluation for possible cardiac causes.  She does not smoke, she does not take hormonal birth control, she has never had a DVT, she is not coughing.  She denies fevers, chills, nasal congestion, abdominal pain, hematuria, dysuria, leg pain.      History provided by:  Patient  Shortness of Breath  Associated symptoms: no abdominal pain, no chest pain, no cough, no diaphoresis, no ear pain, no fever, no headaches, no rash, no sore throat and no vomiting        Review of Systems    Constitutional:  Negative for chills, diaphoresis and fever.   HENT:  Negative for congestion, ear pain, postnasal drip, rhinorrhea and sore throat.    Eyes:  Negative for pain and visual disturbance.   Respiratory:  Positive for shortness of breath. Negative for cough and chest tightness.    Cardiovascular:  Negative for chest pain and palpitations.   Gastrointestinal:  Negative for abdominal pain, constipation, diarrhea, nausea and vomiting.   Genitourinary:  Negative for dysuria and hematuria.   Musculoskeletal:  Negative for arthralgias and back pain.   Skin:  Negative for color change and rash.   Neurological:  Negative for dizziness, seizures, syncope, weakness, light-headedness, numbness and headaches.   All other systems reviewed and are negative.          Objective       ED Triage Vitals [11/10/24 1757]   Temperature Pulse Blood Pressure Respirations SpO2 Patient Position - Orthostatic VS   98.6 °F (37 °C) 74 142/81 16 100 % Sitting      Temp Source Heart Rate Source BP Location FiO2 (%) Pain Score    Oral Monitor Right arm -- --      Vitals      Date and Time Temp Pulse SpO2 Resp BP Pain Score FACES Pain Rating User   11/10/24 1757 98.6 °F (37 °C) 74 100 % 16 142/81 -- -- CR            Physical Exam  Constitutional:       General: She is not in acute distress.     Appearance: She is not diaphoretic.   HENT:      Head: Normocephalic and atraumatic.      Nose: No congestion or rhinorrhea.      Mouth/Throat:      Mouth: Mucous membranes are moist.      Pharynx: No oropharyngeal exudate.   Eyes:      General: No scleral icterus.  Cardiovascular:      Rate and Rhythm: Normal rate and regular rhythm.      Heart sounds: Normal heart sounds. No murmur heard.     No friction rub. No gallop.   Pulmonary:      Effort: No respiratory distress.      Breath sounds: Normal breath sounds. No wheezing, rhonchi or rales.   Abdominal:      General: Abdomen is flat. There is no distension.      Palpations: Abdomen is soft.       Tenderness: There is no abdominal tenderness. There is no guarding.   Lymphadenopathy:      Cervical: No cervical adenopathy.   Skin:     General: Skin is warm and dry.      Capillary Refill: Capillary refill takes less than 2 seconds.      Findings: No rash.   Neurological:      General: No focal deficit present.      Mental Status: She is alert and oriented to person, place, and time.         Results Reviewed       Procedure Component Value Units Date/Time    HS Troponin 0hr (reflex protocol) [[id number]]  (Normal) Collected: 11/10/24 1830    Lab Status: Final result Specimen: Blood from Arm, Right Updated: 11/10/24 1901     hs TnI 0hr <2 ng/L     POCT pregnancy, urine [[id number]]  (Normal) Resulted: 11/10/24 1855    Lab Status: Final result Updated: 11/10/24 1855     EXT Preg Test, Ur Negative     Control Valid    Basic metabolic panel [[id number]] Collected: 11/10/24 1830    Lab Status: Final result Specimen: Blood from Arm, Right Updated: 11/10/24 1855     Sodium 138 mmol/L      Potassium 4.0 mmol/L      Chloride 104 mmol/L      CO2 27 mmol/L      ANION GAP 7 mmol/L      BUN 17 mg/dL      Creatinine 0.97 mg/dL      Glucose 82 mg/dL      Calcium 9.4 mg/dL      eGFR 78 ml/min/1.73sq m     Narrative:      National Kidney Disease Foundation guidelines for Chronic Kidney Disease (CKD):     Stage 1 with normal or high GFR (GFR > 90 mL/min/1.73 square meters)    Stage 2 Mild CKD (GFR = 60-89 mL/min/1.73 square meters)    Stage 3A Moderate CKD (GFR = 45-59 mL/min/1.73 square meters)    Stage 3B Moderate CKD (GFR = 30-44 mL/min/1.73 square meters)    Stage 4 Severe CKD (GFR = 15-29 mL/min/1.73 square meters)    Stage 5 End Stage CKD (GFR <15 mL/min/1.73 square meters)  Note: GFR calculation is accurate only with a steady state creatinine    CBC and differential [[id number]] Collected: 11/10/24 1830    Lab Status: Final result Specimen: Blood from Arm, Right Updated: 11/10/24 1837     WBC 6.05 Thousand/uL      RBC 4.49  Million/uL      Hemoglobin 14.0 g/dL      Hematocrit 41.6 %      MCV 93 fL      MCH 31.2 pg      MCHC 33.7 g/dL      RDW 12.2 %      MPV 9.5 fL      Platelets 258 Thousands/uL      nRBC 0 /100 WBCs      Segmented % 60 %      Immature Grans % 0 %      Lymphocytes % 31 %      Monocytes % 7 %      Eosinophils Relative 1 %      Basophils Relative 1 %      Absolute Neutrophils 3.66 Thousands/µL      Absolute Immature Grans 0.01 Thousand/uL      Absolute Lymphocytes 1.86 Thousands/µL      Absolute Monocytes 0.41 Thousand/µL      Eosinophils Absolute 0.07 Thousand/µL      Basophils Absolute 0.04 Thousands/µL             XR chest 2 views   ED Interpretation by Dion Choi MD (11/10 1859)   No acute cardiopulmonary disease. Independently interpreted by myself.          ECG 12 Lead Documentation Only    Date/Time: 11/10/2024 7:00 PM    Performed by: Dion Choi MD  Authorized by: Dion Choi MD    Indications / Diagnosis:  SOB  ECG reviewed by me, the ED Provider: yes    Patient location:  ED  Previous ECG:     Previous ECG:  Unavailable    Comparison to cardiac monitor: Yes    Interpretation:     Interpretation: normal    Rate:     ECG rate:  68    ECG rate assessment: normal    Rhythm:     Rhythm: sinus rhythm    Ectopy:     Ectopy: none    QRS:     QRS axis:  Normal    QRS intervals:  Normal  Conduction:     Conduction: normal    ST segments:     ST segments:  Normal  T waves:     T waves: normal        ED Medication and Procedure Management   Prior to Admission Medications   Prescriptions Last Dose Informant Patient Reported? Taking?   Tranexamic Acid 650 MG TABS   No No   Sig: Take 2 tablets (1,300 mg total) by mouth every 8 (eight) hours as needed (for heavy bleeding up to 5 days of each menstrual cycle)   buPROPion (WELLBUTRIN XL) 150 mg 24 hr tablet   No No   Sig: TAKE 3 TABLETS BY MOUTH EVERY DAY   multivitamin (THERAGRAN) TABS   Yes No   Sig: Take 1 tablet by mouth daily   vilazodone (Viibryd) 40 mg tablet   Yes  No   Sig: Take 40 mg by mouth daily with breakfast      Facility-Administered Medications: None     Discharge Medication List as of 11/10/2024  7:08 PM        CONTINUE these medications which have NOT CHANGED    Details   buPROPion (WELLBUTRIN XL) 150 mg 24 hr tablet TAKE 3 TABLETS BY MOUTH EVERY DAY, Starting Thu 8/8/2024, Normal      multivitamin (THERAGRAN) TABS Take 1 tablet by mouth daily, Historical Med      Tranexamic Acid 650 MG TABS Take 2 tablets (1,300 mg total) by mouth every 8 (eight) hours as needed (for heavy bleeding up to 5 days of each menstrual cycle), Starting Mon 11/4/2024, Normal      vilazodone (Viibryd) 40 mg tablet Take 40 mg by mouth daily with breakfast, Historical Med           No discharge procedures on file.  ED SEPSIS DOCUMENTATION   Time reflects when diagnosis was documented in both MDM as applicable and the Disposition within this note       Time User Action Codes Description Comment    11/10/2024  7:07 PM iDon Choi [R06.02] Shortness of breath     11/10/2024  7:08 PM Dion Choi [F41.9] Anxiety                  Dion Choi MD  11/10/24 1921       Dion Choi MD  11/11/24 0102       Dion Choi MD  11/11/24 0117

## 2024-11-12 LAB
ATRIAL RATE: 68 BPM
P AXIS: 62 DEGREES
PR INTERVAL: 124 MS
QRS AXIS: 75 DEGREES
QRSD INTERVAL: 80 MS
QT INTERVAL: 392 MS
QTC INTERVAL: 416 MS
T WAVE AXIS: 54 DEGREES
VENTRICULAR RATE: 68 BPM

## 2024-11-12 PROCEDURE — 93010 ELECTROCARDIOGRAM REPORT: CPT | Performed by: INTERNAL MEDICINE

## 2024-12-16 ENCOUNTER — NURSE TRIAGE (OUTPATIENT)
Age: 29
End: 2024-12-16

## 2024-12-16 NOTE — TELEPHONE ENCOUNTER
Patient was on vacation in [place] and her diet was much different for a week or so possibly causing the current constipation she is experiencing.  She had a bowel movement last on Saturday but it was a small BM.   Has only tried colace OTC, increased fiber and water intake, exercising.  Denies abdominal pain, N/V, fevers, blood in the stool, weakness or any other associated symptoms at this time.      I recommended home care with  close observation of symptoms. Home care advice given.  I reviewed S/S that would require patient to head to the ER. Patient verbalized understanding of the above and agreed with home care advice.

## 2024-12-16 NOTE — TELEPHONE ENCOUNTER
"Reason for Disposition  • MILD constipation    Answer Assessment - Initial Assessment Questions  1. STOOL PATTERN OR FREQUENCY: \"How often do you have a bowel movement (BM)?\"  (Normal range: 3 times a day to every 3 days)  \"When was your last BM?\"            Last Tuesday last BM     Saturday passed stool  and watery discharge         2. STRAINING: \"Do you have to strain to have a BM?\"           Yes       3. ONSET: \"When did the constipation begin?\"        1 week ago         4. RECTAL PAIN: \"Does your rectum hurt when the stool comes out?\" If Yes, ask: \"Do you have hemorrhoids? How bad is the pain?\"  (Scale 1-10; or mild, moderate, severe)        Yes only while passing the BM     5. BM COMPOSITION: \"Are the stools hard?\"         Yes     6. BLOOD ON STOOLS: \"Has there been any blood on the toilet tissue or on the surface of the BM?\" If Yes, ask: \"When was the last time?\"          No       7. CHRONIC CONSTIPATION: \"Is this a new problem for you?\"  If No, ask: \"How long have you had this problem?\" (days, weeks, months)             Yes       8. CHANGES IN DIET OR HYDRATION: \"Have there been any recent changes in your diet?\" \"How much fluids are you drinking on a daily basis?\"  \"How much have you had to drink today?\"          Yes was on vacation       9. MEDICINES: \"Have you been taking any new medicines?\" \"Are you taking any narcotic pain medicines?\" (e.g., Dilaudid, morphine, Percocet, Vicodin)              10. LAXATIVES: \"Have you been using any stool softeners, laxatives, or enemas?\"  If Yes, ask \"What, how often, and when was the last time?\"          None     11. ACTIVITY:  \"How much walking do you do every day?\"  \"Has your activity level decreased in the past week?\"             Yes   12. CAUSE: \"What do you think is causing the constipation?\"               Unsure       13. MEDICAL HISTORY: \"Do you have a history of hemorrhoids, rectal fissures, rectal surgery, or rectal abscess?\"           Denies    14. OTHER " "SYMPTOMS: \"Do you have any other symptoms?\" (e.g., abdomen pain, bloating, fever, vomiting)           Bloating    Protocols used: Constipation-Adult-[place]    "

## 2024-12-16 NOTE — TELEPHONE ENCOUNTER
Regarding: zeynepation  ----- Message from Cyndy BOWSER sent at 12/16/2024 12:17 PM EST -----  Hi I wanted to ask if I should come in because I haven't been regular. A week ago (last Tuesday) I had my last actual BM. It was really hard and painful but I assumed it was just because I had just gotten back into the country from my honeymoon to [place] and I was getting used to the food. Well after that I didn't have another bowel movement until Saturday, and that was only a little (and extremely painful with some blood). I ate plenty of fiber all week and exercised and drank plenty of water every day. On Friday evening I felt like i had to go but couldn't. I took stool softeners Friday evening, but by noon on Saturday was in much pain that I tried a suppository. Even that took several hours to work and it only produced a little and it was extremely painful.  That was two days ago, and since then I haven't had the urge to go and feel pretty much fine other than a little bloated. I felt a slight urge this morning but nothing happened when I tried. Any advice what I should do?

## 2025-02-03 ENCOUNTER — RA CDI HCC (OUTPATIENT)
Dept: OTHER | Facility: HOSPITAL | Age: 30
End: 2025-02-03

## 2025-02-04 ENCOUNTER — OFFICE VISIT (OUTPATIENT)
Dept: FAMILY MEDICINE CLINIC | Facility: CLINIC | Age: 30
End: 2025-02-04
Payer: COMMERCIAL

## 2025-02-04 VITALS
WEIGHT: 185.6 LBS | DIASTOLIC BLOOD PRESSURE: 78 MMHG | OXYGEN SATURATION: 98 % | HEART RATE: 78 BPM | TEMPERATURE: 97.6 F | SYSTOLIC BLOOD PRESSURE: 120 MMHG | RESPIRATION RATE: 16 BRPM | HEIGHT: 68 IN | BODY MASS INDEX: 28.13 KG/M2

## 2025-02-04 DIAGNOSIS — N94.6 DYSMENORRHEA: ICD-10-CM

## 2025-02-04 DIAGNOSIS — E55.9 VITAMIN D DEFICIENCY: ICD-10-CM

## 2025-02-04 DIAGNOSIS — F41.1 GAD (GENERALIZED ANXIETY DISORDER): ICD-10-CM

## 2025-02-04 DIAGNOSIS — D50.0 IRON DEFICIENCY ANEMIA DUE TO CHRONIC BLOOD LOSS: Primary | ICD-10-CM

## 2025-02-04 PROCEDURE — 99214 OFFICE O/P EST MOD 30 MIN: CPT | Performed by: FAMILY MEDICINE

## 2025-02-04 NOTE — ASSESSMENT & PLAN NOTE
Improved after switching to viibryd. Working with psych and her therapist. Applying the strategies taught has helped. Continue Wellbutrin 150 mg and viibryd 40 mg daily

## 2025-02-04 NOTE — PROGRESS NOTES
Name: Ro Angel      : 1995      MRN: [id number]  Encounter Provider: Lashell Martin MD  Encounter Date: 2025   Encounter department: [facility]  :  Assessment & Plan  Iron deficiency anemia due to chronic blood loss  Lab Results   Component Value Date    WBC 6.05 11/10/2024    HGB 14.0 11/10/2024    HCT 41.6 11/10/2024    MCV 93 11/10/2024     11/10/2024     Last ferritin was 28 and was taking iron for some time.        GISEL (generalized anxiety disorder)  Improved after switching to viibryd. Working with psych and her therapist. Applying the strategies taught has helped. Continue Wellbutrin 150 mg and viibryd 40 mg daily        Dysmenorrhea  Still with heavy periods but unable to tolerate OCPs. Continue to use TXA       Vitamin D deficiency  Completed high dose supplements. Recommend 1000 iu daily to help maintain levels              Depression Screening and Follow-up Plan: Patient's depression screening was positive with a PHQ-9 score of 5.   Patient assessed for underlying major depression. Brief counseling provided and recommend additional follow-up/re-evaluation next office visit. Patient with underlying depression and was advised to continue current medications as prescribed.   History of Present Illness   Transitioning from lexapro to viibryd has helped    Had BW in Aug. During the blood draw, she fainted and was sent to the ED and told she had vasovagal syncope. Only half of the labs were collected at the time. Did the rest in Nov   Was seen in the ED Nov with anxiety attack which felt like heart attacks. Since she has learned strategies from therapist to manage her anxiety and feels its working. Still taking TXA for heavy periods. Had an episode were she had to pull over around [place] due to feeling lightheaded. This eventually passed. Bowels have improved         Review of Systems    Objective   /78 (BP Location: Left arm, Patient Position:  "Sitting, Cuff Size: Large)   Pulse 78   Temp 97.6 °F (36.4 °C) (Tympanic)   Resp 16   Ht 5' 8\" (1.727 m)   Wt 84.2 kg (185 lb 9.6 oz)   SpO2 98%   BMI 28.22 kg/m²      Physical Exam  Vitals reviewed.   Constitutional:       General: She is not in acute distress.     Appearance: Normal appearance. She is well-developed. She is not toxic-appearing.   HENT:      Head: Normocephalic and atraumatic.      Right Ear: Tympanic membrane normal.      Left Ear: Tympanic membrane normal.      Mouth/Throat:      Mouth: Mucous membranes are moist.   Eyes:      Extraocular Movements: Extraocular movements intact.   Cardiovascular:      Rate and Rhythm: Normal rate and regular rhythm.      Heart sounds: No murmur heard.  Pulmonary:      Effort: Pulmonary effort is normal. No respiratory distress.      Breath sounds: Normal breath sounds. No stridor. No wheezing, rhonchi or rales.   Abdominal:      General: There is no distension.      Palpations: Abdomen is soft. There is no mass.      Tenderness: There is no abdominal tenderness. There is no guarding or rebound.      Hernia: No hernia is present.   Musculoskeletal:      Right lower leg: No edema.      Left lower leg: No edema.   Lymphadenopathy:      Cervical: No cervical adenopathy.   Skin:     General: Skin is warm and dry.      Capillary Refill: Capillary refill takes less than 2 seconds.   Neurological:      Mental Status: She is alert and oriented to person, place, and time.      Gait: Gait normal.   Psychiatric:         Mood and Affect: Mood normal.         Behavior: Behavior normal.         Thought Content: Thought content normal.         "

## 2025-02-14 DIAGNOSIS — F33.2 SEVERE EPISODE OF RECURRENT MAJOR DEPRESSIVE DISORDER, WITHOUT PSYCHOTIC FEATURES (HCC): ICD-10-CM

## 2025-02-14 RX ORDER — BUPROPION HYDROCHLORIDE 150 MG/1
450 TABLET ORAL DAILY
Qty: 270 TABLET | Refills: 1 | Status: SHIPPED | OUTPATIENT
Start: 2025-02-14

## 2025-03-23 ENCOUNTER — PATIENT MESSAGE (OUTPATIENT)
Dept: FAMILY MEDICINE CLINIC | Facility: CLINIC | Age: 30
End: 2025-03-23

## 2025-03-24 DIAGNOSIS — F41.1 GAD (GENERALIZED ANXIETY DISORDER): Primary | ICD-10-CM

## 2025-03-24 DIAGNOSIS — F33.2 SEVERE EPISODE OF RECURRENT MAJOR DEPRESSIVE DISORDER, WITHOUT PSYCHOTIC FEATURES (HCC): ICD-10-CM

## 2025-03-24 RX ORDER — VILAZODONE HYDROCHLORIDE 40 MG/1
40 TABLET ORAL
Qty: 90 TABLET | Refills: 1 | Status: SHIPPED | OUTPATIENT
Start: 2025-03-24

## 2025-03-24 NOTE — PATIENT COMMUNICATION
Patient would like to speak with someone in the office in regards to a medication question. Please advise.

## 2025-03-24 NOTE — TELEPHONE ENCOUNTER
Reason for call:   [x] Refill   [] Prior Auth  [] Other:     Office:   [x] PCP/Provider -   [] Specialty/Provider -     Medication: Vilazodone    Dose/Frequency: 40 mg    Quantity:     Pharmacy: [facility]/pharmacy #[id number] - [place], [place] - [address] [place] [address]     Local Pharmacy   Does the patient have enough for 3 days?   [] Yes   [x] No - Send as HP to POD    Mail Away Pharmacy   Does the patient have enough for 10 days?   [] Yes   [] No - Send as HP to POD

## 2025-03-25 RX ORDER — VILAZODONE HYDROCHLORIDE 40 MG/1
40 TABLET ORAL DAILY
Qty: 30 TABLET | Refills: 2 | OUTPATIENT
Start: 2025-03-25

## 2025-04-02 ENCOUNTER — PATIENT MESSAGE (OUTPATIENT)
Dept: FAMILY MEDICINE CLINIC | Facility: CLINIC | Age: 30
End: 2025-04-02

## 2025-06-02 DIAGNOSIS — F33.2 SEVERE EPISODE OF RECURRENT MAJOR DEPRESSIVE DISORDER, WITHOUT PSYCHOTIC FEATURES (HCC): ICD-10-CM

## 2025-06-03 RX ORDER — BUPROPION HYDROCHLORIDE 150 MG/1
450 TABLET ORAL DAILY
Qty: 270 TABLET | Refills: 0 | OUTPATIENT
Start: 2025-06-03

## 2025-08-05 ENCOUNTER — OFFICE VISIT (OUTPATIENT)
Dept: FAMILY MEDICINE CLINIC | Facility: CLINIC | Age: 30
End: 2025-08-05
Payer: COMMERCIAL

## 2025-08-05 VITALS
OXYGEN SATURATION: 98 % | RESPIRATION RATE: 16 BRPM | HEART RATE: 90 BPM | WEIGHT: 185.8 LBS | SYSTOLIC BLOOD PRESSURE: 120 MMHG | TEMPERATURE: 97.6 F | BODY MASS INDEX: 27.52 KG/M2 | HEIGHT: 69 IN | DIASTOLIC BLOOD PRESSURE: 78 MMHG

## 2025-08-05 DIAGNOSIS — F41.1 GAD (GENERALIZED ANXIETY DISORDER): ICD-10-CM

## 2025-08-05 DIAGNOSIS — Z00.00 ANNUAL PHYSICAL EXAM: Primary | ICD-10-CM

## 2025-08-05 DIAGNOSIS — F33.2 SEVERE EPISODE OF RECURRENT MAJOR DEPRESSIVE DISORDER, WITHOUT PSYCHOTIC FEATURES (HCC): ICD-10-CM

## 2025-08-05 DIAGNOSIS — N94.6 DYSMENORRHEA: ICD-10-CM

## 2025-08-05 DIAGNOSIS — D50.0 IRON DEFICIENCY ANEMIA DUE TO CHRONIC BLOOD LOSS: ICD-10-CM

## 2025-08-05 PROCEDURE — 99395 PREV VISIT EST AGE 18-39: CPT | Performed by: FAMILY MEDICINE
